# Patient Record
Sex: MALE | Race: WHITE | NOT HISPANIC OR LATINO | Employment: OTHER | URBAN - METROPOLITAN AREA
[De-identification: names, ages, dates, MRNs, and addresses within clinical notes are randomized per-mention and may not be internally consistent; named-entity substitution may affect disease eponyms.]

---

## 2019-10-23 NOTE — PROGRESS NOTES
Patient ID: Sindhu Leroy is a 33 y o  male with autism and intractable epilepsy, s/p VNS implantation, who is presenting to Neurology office for evaluation of his seizures  Assessment/Plan:    [facility]  Overall, the description of his events would be consistent with epileptic seizures  He certainly would be at risk of seizures given his intellectual disability and autism  He has been doing much better with his current regimen, and is not having any further generalized tonic clonic seizures  I discussed that I would prefer to continue to work toward the goal of seizure freedom and no side effects with medications  Although complete seizure freedom may be challenging in patients like Yesenia Sal, it would be best to continue to work toward this  -- he is already on good doses of 3 medications  He may have some room to try to increase his Topiramate or Oxcarbazepine, but I would like to get some bloodwork to assess his medication levels before increasing them  Alternatively, we may need to think about trying a different medication  He has only been on 4 medications, so there are a lot of options going forward  -- I did adjust his VNS, as noted below  S/P placement of VNS (vagus nerve stimulation) device  I did adjust his VNS today  I discussed how VNS works  Although he may be eligible for a newer VNS model which has autostimulation features, because he is on rapid cycling of his VNS, autostimulation would not be available with his current settings  I am not certain that autostimulation would provide much additional benefit over his current rapid cycling settings  I did increase his setting slightly to increase his duty cycle  I would like to see how he does on these settings, but if seizures continue, he will likely need medication adjustments      VNS Settings (at end of visit)  Output current 2 5 mA   Signal frequency 20 Hz   Pulse width 250 ìsec   On time 14 sec   Off time 0 3 min       Mag current 2 5 mA   Mag pulse width 250 ìsec   Mag on time 60 sec     He tolerated these changes well without any discomfort  Autism  His behaviors have been under good control, but they have had difficulty managing his weight, mainly due to trouble keeping him active  They will continue to follow with his PCP        He will return to the office in about 4 months  Subjective:    HPI  Current seizure medications:  1  Oxcarbazepine 900 mg twice a day  2  Trokendi 200 mg in the am and 400 mg in the pm  3  Vimpat 400 mg twice a day  4  Zoloft 100 mg daily  Other medications as per Epic    VNS Settings (settings at beginning of visit)  Output current 2 5 mA   Signal frequency 20 Hz   Pulse width 250 ìsec   On time 21 sec   Off time 0 5 min       Mag current 2 5 mA   Mag pulse width 250 ìsec   Mag on time 60 sec     Battery: 50-75 %     Briefly reviewing his history, he had his first seizure when he was about 16years old with "grand mal" seizures where he would lose consciousness and shake all over  These progressed quickly to where he was having 3-4 generalized tonic clonic seizures per week  He was initially following with a neurologist in [place], and was tried on Carbamazepine, Oxcarbazepine, Topiramate, and Lacosamide  He continued to have seizures, so was recommended to get a VNS implanted  The VNS has been quite helpful for him, mainly by improving his generalized tonic clonic seizures  He has continued to have smaller seizures since that time (as described below)  His mother denies any main side effects to his medications  She also does not recognize any side effects with his seizure medications  He most recently was following with Dr Zoraida Barnett most recently and most recently Vimpat was added and increased  His mother states that overall, he has been doing quite well, but continues to have smaller seizures about every 2-3 months  Event/Seizure semiology:  1    Likely focal unaware seizures: he will start with grunting noises, followed by drooling and loss of consciousness  He will often fall and on occasion may have some shaking  These typically last up to a maximum of about 2 5 minutes  These occur about once every 2 months on average  2  Generalized tonic clonic seizure  He will lose consciousness, fall the ground, become stiff all over and shake  Has not occurred in many years  Special Features  Status epilepticus: no  Self Injury Seizures: no  Precipitating Factors: none    Epilepsy Risk Factors:  Born at term via uncomplicated pregnancy, but did have developmental delay and was diagnosed with autism at 4 yo  No h/o febrile seizures, CNS infections, strokes, or CNS neoplasms  There is no family history of seizures or epilepsy  Prior AEDs:  Carbamazepine (ineffective)    Prior Evaluation:  - MRI brain: mother is not sure, but does not recall one in the past   - Routine EE2006: normal  - Routine EE2016: normal  - Video EEG: yes, in [place] in 1941 [address]  No events were captured    Psychiatric History:  Depression: no  Anxiety: no  Psychosis: no  Psychiatric Admissions: yes in  or  due to destructive behavior    His history was also obtained from his mother, who was present at today's visit  I reviewed prior notes including prior EEG reports and notes from Dr Estella Aponte office, as documented in Epic/Protenus, and summarized above  The following portions of the patient's history were reviewed and updated as appropriate: allergies, current medications, past family history, past medical history, past social history, past surgical history and problem list      Objective:    Blood pressure 132/84, pulse 68, height 5' 7" (1 702 m), weight (!) 142 kg (312 lb)  Physical Exam    Neurological Exam  GENERAL EXAMINATION:   In general patient is obese, but well appearing and in no distress  There is no peripheral edema  NEUROLOGIC EXAMINATION:     Awake and interactive  He would repeat phrases, but not answer questions  Language: he would say some simple words and has echolalia, so would repeat some simple phrases  He would follow simple commands, but was not able to cooperate fully with examination due to his autism  Cranial nerves: Pupils are equal round reactive to light and accommodation  Visual Fields are full to blink to threat  He had normal saccadic eye movements in all directions, but was not able to cooperate with tracking objects  No facial droop, face activates symmetrically  Hearing was intact to conversation, but could not assess finger rub  Tongue and uvula are midline and palate elevates symmetrically  Shoulder shrug  5/5  Motor Exam:  No pronator drift when briefly holding arms outstretched  Bulk and tone are normal  He moves all extremities symmetrically with appearing full strength, but was not able to cooperate with formal strength testing  Deep tendon reflexes: Biceps 2+, brachioradialis 2+, patellar 2+, Achilles 2+ bilaterally  Sensation: responded to light touch in all extremities  Coordination: he was not able to perform finger nose finger and heel to shin testing, but was able to accurately give "high fives" without any ataxia  Gait: not able to assess Romberg  Normal gait  ROS:    Review of Systems   Constitutional: Positive for unexpected weight change  Negative for appetite change and fever  HENT: Negative  Negative for hearing loss, tinnitus, trouble swallowing and voice change  Eyes: Negative  Negative for photophobia and pain  Respiratory: Negative  Negative for shortness of breath  Cardiovascular: Negative  Negative for palpitations  Gastrointestinal: Negative  Negative for nausea and vomiting  Endocrine: Negative  Negative for cold intolerance and heat intolerance  Genitourinary: Negative  Negative for dysuria, frequency and urgency  Musculoskeletal: Negative    Negative for myalgias and neck pain    Skin: Negative  Negative for rash  Neurological: Negative  Negative for dizziness, tremors, seizures, syncope, facial asymmetry, speech difficulty, weakness, light-headedness, numbness and headaches  Hematological: Negative  Does not bruise/bleed easily  Psychiatric/Behavioral: Negative  Negative for confusion, hallucinations and sleep disturbance           I personally reviewed the ROS that was entered by the medical assistant

## 2019-10-24 ENCOUNTER — OFFICE VISIT (OUTPATIENT)
Dept: NEUROLOGY | Facility: CLINIC | Age: 29
End: 2019-10-24
Payer: MEDICARE

## 2019-10-24 VITALS
SYSTOLIC BLOOD PRESSURE: 132 MMHG | DIASTOLIC BLOOD PRESSURE: 84 MMHG | HEART RATE: 68 BPM | HEIGHT: 67 IN | WEIGHT: 312 LBS | BODY MASS INDEX: 48.97 KG/M2

## 2019-10-24 DIAGNOSIS — Z96.89 S/P PLACEMENT OF VNS (VAGUS NERVE STIMULATION) DEVICE: ICD-10-CM

## 2019-10-24 DIAGNOSIS — G40.919 INTRACTABLE EPILEPSY WITHOUT STATUS EPILEPTICUS, UNSPECIFIED EPILEPSY TYPE (HCC): Primary | ICD-10-CM

## 2019-10-24 DIAGNOSIS — F84.0 AUTISM: ICD-10-CM

## 2019-10-24 PROBLEM — G40.909 EPILEPSY (HCC): Status: ACTIVE | Noted: 2019-10-24

## 2019-10-24 PROCEDURE — 99204 OFFICE O/P NEW MOD 45 MIN: CPT | Performed by: PSYCHIATRY & NEUROLOGY

## 2019-10-24 PROCEDURE — 95976 ALYS SMPL CN NPGT PRGRMG: CPT | Performed by: PSYCHIATRY & NEUROLOGY

## 2019-10-24 RX ORDER — OXCARBAZEPINE 600 MG/1
900 TABLET, FILM COATED ORAL 2 TIMES DAILY
Refills: 0 | COMMUNITY
Start: 2019-10-13 | End: 2019-10-24 | Stop reason: SDUPTHER

## 2019-10-24 RX ORDER — OXCARBAZEPINE 600 MG/1
900 TABLET, FILM COATED ORAL 2 TIMES DAILY
Qty: 270 TABLET | Refills: 3 | Status: SHIPPED | OUTPATIENT
Start: 2019-10-24 | End: 2020-07-16 | Stop reason: SDUPTHER

## 2019-10-24 RX ORDER — SERTRALINE HYDROCHLORIDE 100 MG/1
TABLET, FILM COATED ORAL
Refills: 0 | COMMUNITY
Start: 2019-10-21 | End: 2020-03-26 | Stop reason: SDUPTHER

## 2019-10-24 RX ORDER — LACOSAMIDE 200 MG/1
TABLET, FILM COATED ORAL
Refills: 0 | COMMUNITY
Start: 2019-10-07 | End: 2019-10-24 | Stop reason: SDUPTHER

## 2019-10-24 RX ORDER — LACOSAMIDE 200 MG/1
TABLET, FILM COATED ORAL
Qty: 120 TABLET | Refills: 5 | Status: SHIPPED | OUTPATIENT
Start: 2019-10-24 | End: 2020-03-26 | Stop reason: SDUPTHER

## 2019-10-24 NOTE — PATIENT INSTRUCTIONS
-- Continue to give his seizure medications unchanged  -- I did increase his VNS settings today  -- Please get some bloodwork before his next appointment

## 2019-10-24 NOTE — ASSESSMENT & PLAN NOTE
Overall, the description of his events would be consistent with epileptic seizures  He certainly would be at risk of seizures given his intellectual disability and autism  He has been doing much better with his current regimen, and is not having any further generalized tonic clonic seizures  I discussed that I would prefer to continue to work toward the goal of seizure freedom and no side effects with medications  Although complete seizure freedom may be challenging in patients like Anila Harris, it would be best to continue to work toward this  -- he is already on good doses of 3 medications  He may have some room to try to increase his Topiramate or Oxcarbazepine, but I would like to get some bloodwork to assess his medication levels before increasing them  Alternatively, we may need to think about trying a different medication  He has only been on 4 medications, so there are a lot of options going forward  -- I did adjust his VNS, as noted below

## 2019-10-24 NOTE — ASSESSMENT & PLAN NOTE
His behaviors have been under good control, but they have had difficulty managing his weight, mainly due to trouble keeping him active   They will continue to follow with his PCP

## 2019-10-24 NOTE — ASSESSMENT & PLAN NOTE
I did adjust his VNS today  I discussed how VNS works  Although he may be eligible for a newer VNS model which has autostimulation features, because he is on rapid cycling of his VNS, autostimulation would not be available with his current settings  I am not certain that autostimulation would provide much additional benefit over his current rapid cycling settings  I did increase his setting slightly to increase his duty cycle  I would like to see how he does on these settings, but if seizures continue, he will likely need medication adjustments  VNS Settings (at end of visit)  Output current 2 5 mA   Signal frequency 20 Hz   Pulse width 250 ìsec   On time 14 sec   Off time 0 3 min       Mag current 2 5 mA   Mag pulse width 250 ìsec   Mag on time 60 sec     He tolerated these changes well without any discomfort

## 2020-01-24 ENCOUNTER — TELEPHONE (OUTPATIENT)
Dept: NEUROLOGY | Facility: CLINIC | Age: 30
End: 2020-01-24

## 2020-01-24 DIAGNOSIS — G40.919 INTRACTABLE EPILEPSY WITHOUT STATUS EPILEPTICUS, UNSPECIFIED EPILEPSY TYPE (HCC): Primary | ICD-10-CM

## 2020-01-24 NOTE — TELEPHONE ENCOUNTER
Ordinarily since he is not having frequent prolonged seizures, we wouldn't necessarily need to have a rescue medication available  Since it has been such a long time since he had a generalized tonic clonic seizure, if he would have a prolonged seizure lasting greater than 5 min or a cluster of multiple seizures (which are the situations that would require a rescue medication), it may be best for him to be evaluated in the ED either way  If they feel more comfortable having something available just in case, I am okay ordering  Please just find out what dose they have available currently

## 2020-01-24 NOTE — TELEPHONE ENCOUNTER
Patient's mother Jack Sewell called the RX line requesting diazepam she said she uses it just  for emergency's and I did not see it on his med list so I am sending this to you so you can decided if you want to give him a script for it , [facility]

## 2020-01-28 NOTE — TELEPHONE ENCOUNTER
Called mother, read Dr Jus Pinto message  She verbalizes understanding  States she has never had to use it but would like to have it available while patient is at school  States she still had a bottle at school to be safe but it is   Bottle states Diazepam 20 mg MI for episode lasting more than 3 minutes  States she would like it for more than 5 min  Best call back number [phone]  States it is okay to leave detailed message

## 2020-01-29 RX ORDER — DIAZEPAM 20 MG/4ML
20 GEL RECTAL AS NEEDED
Qty: 2 EACH | Refills: 0 | Status: SHIPPED | OUTPATIENT
Start: 2020-01-29 | End: 2021-07-01

## 2020-03-26 ENCOUNTER — TELEMEDICINE (OUTPATIENT)
Dept: NEUROLOGY | Facility: CLINIC | Age: 30
End: 2020-03-26
Payer: MEDICARE

## 2020-03-26 DIAGNOSIS — G40.919 INTRACTABLE EPILEPSY WITHOUT STATUS EPILEPTICUS, UNSPECIFIED EPILEPSY TYPE (HCC): ICD-10-CM

## 2020-03-26 DIAGNOSIS — Z96.89 S/P PLACEMENT OF VNS (VAGUS NERVE STIMULATION) DEVICE: Primary | ICD-10-CM

## 2020-03-26 PROCEDURE — 99214 OFFICE O/P EST MOD 30 MIN: CPT | Performed by: PSYCHIATRY & NEUROLOGY

## 2020-03-26 RX ORDER — SERTRALINE HYDROCHLORIDE 100 MG/1
100 TABLET, FILM COATED ORAL DAILY
Qty: 30 TABLET | Refills: 11 | Status: SHIPPED | OUTPATIENT
Start: 2020-03-26 | End: 2020-12-17 | Stop reason: SDUPTHER

## 2020-03-26 RX ORDER — TOPIRAMATE 100 MG/1
CAPSULE, EXTENDED RELEASE ORAL
Qty: 30 CAPSULE | Refills: 11 | Status: SHIPPED | OUTPATIENT
Start: 2020-03-26 | End: 2020-12-17 | Stop reason: SDUPTHER

## 2020-03-26 RX ORDER — LACOSAMIDE 200 MG/1
TABLET, FILM COATED ORAL
Qty: 120 TABLET | Refills: 5 | Status: SHIPPED | OUTPATIENT
Start: 2020-03-26 | End: 2020-07-16 | Stop reason: SDUPTHER

## 2020-03-26 RX ORDER — TOPIRAMATE 200 MG/1
CAPSULE, EXTENDED RELEASE ORAL
Qty: 90 CAPSULE | Refills: 11 | Status: SHIPPED | OUTPATIENT
Start: 2020-03-26 | End: 2020-12-17 | Stop reason: SDUPTHER

## 2020-07-14 NOTE — PROGRESS NOTES
Patient ID: Gill Wade is a 30 y o  male with autism and intractable epilepsy, s/p VNS implantation, who is returning to Neurology office for follow up of his seizures  Assessment/Plan:    Epilepsy ([facility] )  Overall he has been doing better and has not had any recurrent seizures since his last appointment with his current treatment regimen  Because he is doing well and denies any side effects, I would not recommend any changes to his medications today  --he will continue oxcarbazepine, Vimpat, and Trokendi unchanged  If he would have any additional seizures, we likely would need to consider trying a different medication    S/P placement of VNS (vagus nerve stimulation) device  He continues to tolerate his VNS well without any problems  His better he is getting low, but is still adequate  For follow his VNS battery life, as he may need to initiate the process to have the device replaced when the battery drops below 25%        He will Return in about 4 months (around 11/16/2020)  Subjective:    HPI  Current seizure medications:  1  Oxcarbazepine 900 mg twice a day  2  Vimpat 400 mg twice a day  3  Trokendi 300 mg in the am and 400 mg in the pm    Other medications as per Epic  VNS Settings (interrogated today)  Output current 2 25 mA   Signal frequency 20 Hz   Pulse width 250 ìsec   On time 14 sec   Off time 0 3 min       Mag current 2 5 mA   Mag pulse width 250 ìsec   Mag on time 60 sec     Battery: 25-50%      Since his last visit, he did increase Trokendi to the above dose, and his mother has not noted any seizures and she denies any side effects to his medications  He overall has been doing well  He has not been able to go to his day program due to [place], but he has been doing well otherwise  He continues to tolerate his VNS well, and denies any problems or issues related to this including denying hoarse voice, difficulty swallowing    He did gain a little weight gain       Prior Seizure Medications: Carbamazepine (ineffective )    His history was also obtained from his mother, who was present at today's visit  The following portions of the patient's history were reviewed and updated as appropriate: allergies, current medications, past medical history and problem list      Objective:    Blood pressure 140/82, pulse 82, temperature 97 6 °F (36 4 °C), height 5' 7" (1 702 m), weight (!) 142 kg (313 lb)  Physical Exam    Neurological Exam      ROS:    Review of Systems   Constitutional: Negative  Negative for appetite change and fever  HENT: Negative  Negative for hearing loss, tinnitus, trouble swallowing and voice change  Eyes: Negative  Negative for photophobia and pain  Respiratory: Negative  Negative for shortness of breath  Cardiovascular: Negative  Negative for palpitations  Gastrointestinal: Negative  Negative for nausea and vomiting  Endocrine: Negative  Negative for cold intolerance  Genitourinary: Negative  Negative for dysuria, frequency and urgency  Musculoskeletal: Negative  Negative for myalgias and neck pain  Skin: Negative  Negative for rash  Allergic/Immunologic: Negative  Neurological: Negative  Negative for dizziness, tremors, seizures, syncope, facial asymmetry, speech difficulty, weakness, light-headedness, numbness and headaches  Hematological: Negative  Does not bruise/bleed easily  Psychiatric/Behavioral: Negative  Negative for confusion, hallucinations and sleep disturbance         I personally reviewed the review of systems that was entered by the medical assistant

## 2020-07-16 ENCOUNTER — TELEPHONE (OUTPATIENT)
Dept: NEUROLOGY | Facility: CLINIC | Age: 30
End: 2020-07-16

## 2020-07-16 ENCOUNTER — OFFICE VISIT (OUTPATIENT)
Dept: NEUROLOGY | Facility: CLINIC | Age: 30
End: 2020-07-16
Payer: MEDICARE

## 2020-07-16 VITALS
SYSTOLIC BLOOD PRESSURE: 140 MMHG | DIASTOLIC BLOOD PRESSURE: 82 MMHG | TEMPERATURE: 97.6 F | HEART RATE: 82 BPM | BODY MASS INDEX: 49.12 KG/M2 | WEIGHT: 313 LBS | HEIGHT: 67 IN

## 2020-07-16 DIAGNOSIS — G40.919 INTRACTABLE EPILEPSY WITHOUT STATUS EPILEPTICUS, UNSPECIFIED EPILEPSY TYPE (HCC): ICD-10-CM

## 2020-07-16 DIAGNOSIS — Z96.89 S/P PLACEMENT OF VNS (VAGUS NERVE STIMULATION) DEVICE: Primary | ICD-10-CM

## 2020-07-16 PROCEDURE — 95970 ALYS NPGT W/O PRGRMG: CPT | Performed by: PSYCHIATRY & NEUROLOGY

## 2020-07-16 PROCEDURE — 99214 OFFICE O/P EST MOD 30 MIN: CPT | Performed by: PSYCHIATRY & NEUROLOGY

## 2020-07-16 RX ORDER — OXCARBAZEPINE 600 MG/1
900 TABLET, FILM COATED ORAL 2 TIMES DAILY
Qty: 270 TABLET | Refills: 3 | Status: SHIPPED | OUTPATIENT
Start: 2020-07-16 | End: 2020-12-17 | Stop reason: SDUPTHER

## 2020-07-16 RX ORDER — LACOSAMIDE 200 MG/1
TABLET, FILM COATED ORAL
Qty: 120 TABLET | Refills: 5 | Status: SHIPPED | OUTPATIENT
Start: 2020-07-16 | End: 2020-12-17 | Stop reason: SDUPTHER

## 2020-07-16 NOTE — LETTER
July 16, 2020     [facility] [id number] [facility]  [address] [address]     Dear Mr Haydee Arreaga,    Appointment Missed: 7/16/2020      Appointment Scheduled with: Sagar Stevens MD    We understand that many situations arise that occasionally prevents patients from keeping scheduled appointments  It is the policy of [address] [place] [address] Neurology [facility] that patients notify us 24 hours in advance if unable to keep a scheduled appointment  Missed appointments jeopardize strong physician-patient relationships  The appointment you missed could have easily been made available to another patient if you had contacted us to cancel  We like to accommodate all of our patients, but when patients miss an appointment it prevents us from being able to help everyone  In the future, we request at least a 24 hour notice of cancellation so we can make your appointment available to someone else in need         Sincerely,    The Physicians and Staff of [address] [place] [address] Neurology [facility]

## 2020-07-16 NOTE — TELEPHONE ENCOUNTER
Left message to schedule appointment as patient NO/Show for his appt today 7/16/2020 with Jaime Piedra

## 2020-07-16 NOTE — PATIENT INSTRUCTIONS
-- Continue to give his seizure medications unchanged  -- His VNS battery is a little lower than before, but still okay  We will need to assure we continue to follow the VNS battery level

## 2020-09-21 NOTE — ASSESSMENT & PLAN NOTE
He continues to tolerate his VNS well without any problems  His better he is getting low, but is still adequate    For follow his VNS battery life, as he may need to initiate the process to have the device replaced when the battery drops below 25%

## 2020-09-21 NOTE — ASSESSMENT & PLAN NOTE
Overall he has been doing better and has not had any recurrent seizures since his last appointment with his current treatment regimen  Because he is doing well and denies any side effects, I would not recommend any changes to his medications today  --he will continue oxcarbazepine, Vimpat, and Trokendi unchanged    If he would have any additional seizures, we likely would need to consider trying a different medication

## 2020-12-16 NOTE — PROGRESS NOTES
Virtual Brief Visit    Assessment/Plan:    Patient ID: You Gould is a 35 y o  male with autism and intractable epilepsy, s/p VNS implantation, who is returning for follow-up of seizures  Assessment/Plan:    Epilepsy [facility])  He did have a breakthrough seizure since his last appointment  He has been tolerating his medications well without any side effects  He did previously have a good response to oxcarbazepine, so I will increase his dose of oxcarbazepine a little bit today  --he will increase oxcarbazepine to be 900 mg in the morning and 1200 mg at night  I did discuss the risks rationale this increase including potential side effects  Because we are increasing this dose, will be important for them to watch for any evidence of toxicity, and if this would occur, we may need to decrease his dose back down to try an alternative medication  S/P placement of VNS (vagus nerve stimulation) device    I discussed that because his VNS is on rapid cycling, and will be important for us to interrogate this at his next office appointment  I would prefer that he be seen in person, so that we can interrogate this and assure that he has adequate battery life  He will No follow-ups on file  Reason for visit is   Chief Complaint   Patient presents with    Seizures        Encounter provider Yoselyn Betancourt MD    Provider located at [address] [address]  [address] [place] [address] [place] [id number]    Recent Visits  No visits were found meeting these conditions  Showing recent visits within past 7 days and meeting all other requirements     Future Appointments  No visits were found meeting these conditions     Showing future appointments within next 150 days and meeting all other requirements      It was my intent to perform this visit via video technology but the patient was not able to do a video connection so the visit was completed via audio telephone only  After connecting through telephone, the patient was identified by name and date of birth  Yumiko Escalona's caregiver (his mother) was informed that this is a telemedicine visit and that the visit is being conducted through telephone  My office door was closed  No one else was in the room  The caregiver acknowledged consent and understanding of privacy and security of the platform  The patient's caregiver has agreed to participate and understands they can discontinue the visit at any time  Patient's caregiver is aware this is a billable service  Yumiko Faustin was not able to consent to the above due to intellectual disability      Patient is aware this is a billable service  Subjective    Hamzah Faustin is a 35 y o  male     Since his last visit, he has been doing pretty good  His school has been closed on and off  He did have one seizure where he was on the computer  He suddenly fell to the floor, off of the chair  He did briefly stiffen and shake  This lasted maybe 2 min  There were no clear triggers or missed doses of his medications  He has been gaining weight, but that largely has been due to lack of physical activity  Otherwise, he has been doing well  They deny any side effects to his medications  His mother reports that he did get some bloodwork in about September of this last year  This was done at [facility] outpatient services  Current seizure medications:  1  Oxcarbazepine 900 mg twice a day  2  Vimpat 400 mg twice a day  3  Trokendi 300 mg in the morning and 400 mg at night  Other medications as per Epic      VNS Settings (not able to be interrogated today due to virtual visit)  Output current 2 25 mA   Signal frequency 20 Hz   Pulse width 250 ?sec   On time 14 sec   Off time 0 3 min         Mag current 2 5 mA   Mag pulse width 250 ?sec   Mag on time 60 sec     Prior Medications:  Carbamazepine (ineffective)    The following portions of the patient's history were reviewed and updated as appropriate: allergies, current medications, past medical history and problem list    HPI     Past Medical History:   Diagnosis Date    Autism     Seizures ([facility] [address] )        Past Surgical History:   Procedure Laterality Date    VAGAL NERVE STIMULATOR (VNS) PLACEMENT         Current Outpatient Medications   Medication Sig Dispense Refill    OXcarbazepine (TRILEPTAL) 600 mg tablet Give 900 mg (1 5 tabs) in the morning and 1200 mg (2 tabs) at night  315 tablet 3    sertraline (ZOLOFT) 100 mg tablet Take 1 tablet (100 mg total) by mouth daily 30 tablet 11    Trokendi  MG CP24 Take 1 pill in the morning with one 200 mg pill for a total morning dose of 300 mg in the morning  30 capsule 11    Trokendi  MG CP24 Take 200 (1 tab) in the morning and 400 mg (2 tabs) at night 90 capsule 11    Vimpat 200 MG tablet Take 400 mg (2 tabs) twice a day 120 tablet 5    diazePAM 20 MG GEL Insert 20 mg into the rectum as needed (for seizure greater than 5 minutes or cluster of seizures) (Patient not taking: Reported on 12/16/2020) 2 each 0     No current facility-administered medications for this visit  Allergies   Allergen Reactions    Amoxicillin Hives       Review of Systems   Constitutional: Negative  Negative for appetite change and fever  HENT: Negative  Negative for hearing loss, tinnitus, trouble swallowing and voice change  Eyes: Negative  Negative for photophobia and pain  Respiratory: Negative  Negative for shortness of breath  Cardiovascular: Negative  Negative for palpitations  Gastrointestinal: Negative  Negative for nausea and vomiting  Endocrine: Negative  Negative for cold intolerance  Genitourinary: Negative  Negative for dysuria, frequency and urgency  Musculoskeletal: Negative  Negative for back pain, gait problem, myalgias and neck pain  Skin: Negative  Negative for rash  Neurological: Negative    Negative for dizziness, tremors, seizures, syncope, facial asymmetry, speech difficulty, weakness, light-headedness, numbness and headaches  Hematological: Negative  Does not bruise/bleed easily  Psychiatric/Behavioral: Negative  Negative for confusion, decreased concentration, hallucinations and sleep disturbance  I personally reviewed the review of systems that was entered by the medical assistant     There were no vitals filed for this visit  I spent 18 minutes with patient today in which greater than 50% of the time was spent in counseling/coordination of care regarding Medications and plan, as detailed above    VIRTUAL VISIT [address] caregiver acknowledges that he/she has consented to an online visit or consultation and understands that the online visit is based solely on information provided by them, and that, in the absence of a face-to-face physical evaluation by the physician, the diagnosis received is both limited and provisional in terms of accuracy and completeness  This is not intended to replace a full medical face-to-face evaluation by the physician  Ronadl Gaona Escalona's caregiver understands and accepts these terms

## 2020-12-17 ENCOUNTER — TELEMEDICINE (OUTPATIENT)
Dept: NEUROLOGY | Facility: CLINIC | Age: 30
End: 2020-12-17
Payer: MEDICARE

## 2020-12-17 DIAGNOSIS — G40.919 INTRACTABLE EPILEPSY WITHOUT STATUS EPILEPTICUS, UNSPECIFIED EPILEPSY TYPE (HCC): ICD-10-CM

## 2020-12-17 DIAGNOSIS — Z96.89 S/P PLACEMENT OF VNS (VAGUS NERVE STIMULATION) DEVICE: Primary | ICD-10-CM

## 2020-12-17 PROCEDURE — 99442 PR PHYS/QHP TELEPHONE EVALUATION 11-20 MIN: CPT | Performed by: PSYCHIATRY & NEUROLOGY

## 2020-12-17 RX ORDER — SERTRALINE HYDROCHLORIDE 100 MG/1
100 TABLET, FILM COATED ORAL DAILY
Qty: 30 TABLET | Refills: 11 | Status: SHIPPED | OUTPATIENT
Start: 2020-12-17 | End: 2022-01-14

## 2020-12-17 RX ORDER — LACOSAMIDE 200 MG/1
TABLET, FILM COATED ORAL
Qty: 120 TABLET | Refills: 5 | Status: SHIPPED | OUTPATIENT
Start: 2020-12-17 | End: 2021-07-01 | Stop reason: SDUPTHER

## 2020-12-17 RX ORDER — TOPIRAMATE 100 MG/1
CAPSULE, EXTENDED RELEASE ORAL
Qty: 30 CAPSULE | Refills: 11 | Status: SHIPPED | OUTPATIENT
Start: 2020-12-17 | End: 2021-10-21 | Stop reason: SDUPTHER

## 2020-12-17 RX ORDER — OXCARBAZEPINE 600 MG/1
TABLET, FILM COATED ORAL
Qty: 315 TABLET | Refills: 3 | Status: SHIPPED | OUTPATIENT
Start: 2020-12-17 | End: 2021-10-21 | Stop reason: SDUPTHER

## 2020-12-17 RX ORDER — TOPIRAMATE 200 MG/1
CAPSULE, EXTENDED RELEASE ORAL
Qty: 90 CAPSULE | Refills: 11 | Status: SHIPPED | OUTPATIENT
Start: 2020-12-17 | End: 2021-10-21 | Stop reason: SDUPTHER

## 2020-12-17 NOTE — PATIENT INSTRUCTIONS
-- Please increase his Oxcarbazepine to be 900 mg in the morning and 1200 mg at night  -- Continue his other seizure medications unchanged  -- Please look out for side effects including dizziness, clumsiness, imbalance, nausea, or other problems  If he would develop new side effects with the increase in medication, please give our office a call, since we will need to lower the dose back down and try different medication    -- we will try to track down the bloodwork that was done in September  If we can't see that this was completed, we may order repeat bloodwork

## 2021-01-12 NOTE — ASSESSMENT & PLAN NOTE
He did have a breakthrough seizure since his last appointment  He has been tolerating his medications well without any side effects  He did previously have a good response to oxcarbazepine, so I will increase his dose of oxcarbazepine a little bit today  --he will increase oxcarbazepine to be 900 mg in the morning and 1200 mg at night  I did discuss the risks rationale this increase including potential side effects  Because we are increasing this dose, will be important for them to watch for any evidence of toxicity, and if this would occur, we may need to decrease his dose back down to try an alternative medication

## 2021-01-12 NOTE — ASSESSMENT & PLAN NOTE
I discussed that because his VNS is on rapid cycling, and will be important for us to interrogate this at his next office appointment  I would prefer that he be seen in person, so that we can interrogate this and assure that he has adequate battery life

## 2021-06-21 ENCOUNTER — TELEPHONE (OUTPATIENT)
Dept: NEUROLOGY | Facility: CLINIC | Age: 31
End: 2021-06-21

## 2021-07-01 ENCOUNTER — TELEPHONE (OUTPATIENT)
Dept: NEUROLOGY | Facility: CLINIC | Age: 31
End: 2021-07-01

## 2021-07-01 ENCOUNTER — OFFICE VISIT (OUTPATIENT)
Dept: NEUROLOGY | Facility: CLINIC | Age: 31
End: 2021-07-01
Payer: MEDICARE

## 2021-07-01 VITALS
SYSTOLIC BLOOD PRESSURE: 144 MMHG | WEIGHT: 315 LBS | BODY MASS INDEX: 49.44 KG/M2 | HEIGHT: 67 IN | DIASTOLIC BLOOD PRESSURE: 90 MMHG

## 2021-07-01 DIAGNOSIS — Z96.89 S/P PLACEMENT OF VNS (VAGUS NERVE STIMULATION) DEVICE: Primary | ICD-10-CM

## 2021-07-01 DIAGNOSIS — G40.919 INTRACTABLE EPILEPSY WITHOUT STATUS EPILEPTICUS, UNSPECIFIED EPILEPSY TYPE (HCC): ICD-10-CM

## 2021-07-01 PROCEDURE — 99214 OFFICE O/P EST MOD 30 MIN: CPT | Performed by: PSYCHIATRY & NEUROLOGY

## 2021-07-01 RX ORDER — LACOSAMIDE 200 MG/1
TABLET, FILM COATED ORAL
Qty: 120 TABLET | Refills: 5 | Status: SHIPPED | OUTPATIENT
Start: 2021-07-01 | End: 2021-10-21 | Stop reason: SDUPTHER

## 2021-07-01 RX ORDER — DIAZEPAM 20 MG/4ML
20 GEL RECTAL AS NEEDED
Qty: 2 EACH | Refills: 0 | Status: CANCELLED | OUTPATIENT
Start: 2021-07-01

## 2021-07-01 RX ORDER — DIAZEPAM 10 MG/100UL
SPRAY NASAL
Qty: 4 EACH | Refills: 0 | Status: SHIPPED | OUTPATIENT
Start: 2021-07-01

## 2021-07-01 NOTE — PATIENT INSTRUCTIONS
-- I will not make any changes to your medications today  -- I will refer you to see Neurosurgery to have the VNS generator replaced  -- I did also send a new prescription for Valtoco (intranasal diazepam) as a rescue medication

## 2021-07-01 NOTE — TELEPHONE ENCOUNTER
Pt had an office visit with Dr Dudley Jsoe on 7/1/2021  Dr Dudley Jose would like the pt to have a 3 month follow up appt  Dr Dudley Jose does not have availability in [place] and the pt does not want to be seen in another office  Explained to pt that someone will call the pt to schedule the follow up appt

## 2021-07-01 NOTE — PROGRESS NOTES
Patient ID: Janiya Najera is a 35 y o  male with autism and intractable epilepsy, s/p VNS implantation, who is returning to Neurology office for follow up of his seizures  Assessment/Plan:    Epilepsy [facility])    He has not had any additional seizures since his last appointment is tolerating his medications well  He also has been doing well with his vagal nerve stimulator, and they deny any other problems today  Because he is doing well overall, I would not recommend any changes to his medications today  -- he will continue oxcarbazepine, Vimpat, and Trokendi unchanged  S/P placement of VNS (vagus nerve stimulation) device    I interrogated his VNS, his battery is getting quite low, now being in the 11-25% range  He has benefited from the addition of the vagal nerve stimulator, so be best for us to replace the device  -- I will refer him to Neurosurgery for replacement of his VNS generator  When he does have this replaced, I would recommend upgrading to the [facility] device, since this would allow us to potentially use the auto stimulation feature if desired  He has been seizure-free, so there may be little need to change his settings, but utilizing the auto stimulation feature may allow us to preserve his battery life longer if possible  He will Return in about 3 months (around 10/1/2021)  Subjective:    HPI  Current seizure medications:  1  Oxcarbazepine 900 mg in the morning and 1200 mg at night  2  Vimpat 400 mg twice a day  3  Trokendi 300 mg in the morning and 400 mg at night  Other medications as per Epic  VNS Settings (interrogated today)  Output current 2 25 mA   Signal frequency 20 Hz   Pulse width 250 ?sec   On time 14 sec   Off time 0 3 min       Mag current 2 5 mA   Mag pulse width 250 ?sec   Mag on time 60 sec     Battery: 11-25%     Since his last visit, he has not had any seizures for several months since his Oxcarbazepine was increased     He continues to tolerate his medications well without any significant side effects  His mother does note that he has had weight gain and has difficulty with overeating and impulse control  He has been following with his PCP and has been noted to have elevated blood pressure  There considering starting antihypertensives  Prior Seizure Medications: Carbamazepine (ineffective)    His history was also obtained from his mother, who was present at today's visit  Objective:    Blood pressure 144/90, height 5' 7" (1 702 m), weight (!) 147 kg (325 lb)  Physical Exam    Neurological Exam      ROS:    Review of Systems   Constitutional: Positive for appetite change  Negative for fever  HENT: Negative  Negative for hearing loss, tinnitus, trouble swallowing and voice change  Eyes: Negative  Negative for photophobia and pain  Respiratory: Positive for shortness of breath  Cardiovascular: Negative  Negative for palpitations  Gastrointestinal: Negative  Negative for nausea and vomiting  Endocrine: Negative  Negative for cold intolerance  Genitourinary: Negative  Negative for dysuria, frequency and urgency  Musculoskeletal: Negative  Negative for myalgias and neck pain  Skin: Negative  Negative for rash  Neurological: Negative  Negative for dizziness, tremors, seizures, syncope, facial asymmetry, speech difficulty, weakness, light-headedness, numbness and headaches  Hematological: Negative  Does not bruise/bleed easily  Psychiatric/Behavioral: Negative  Negative for confusion, hallucinations and sleep disturbance  All other systems reviewed and are negative        I personally reviewed the ROS that was entered by the medical assistant

## 2021-07-05 NOTE — ASSESSMENT & PLAN NOTE
I interrogated his VNS, his battery is getting quite low, now being in the 11-25% range  He has benefited from the addition of the vagal nerve stimulator, so be best for us to replace the device  -- I will refer him to Neurosurgery for replacement of his VNS generator  When he does have this replaced, I would recommend upgrading to the [facility] device, since this would allow us to potentially use the auto stimulation feature if desired  He has been seizure-free, so there may be little need to change his settings, but utilizing the auto stimulation feature may allow us to preserve his battery life longer if possible

## 2021-07-20 ENCOUNTER — TELEPHONE (OUTPATIENT)
Dept: NEUROLOGY | Facility: CLINIC | Age: 31
End: 2021-07-20

## 2021-07-20 NOTE — TELEPHONE ENCOUNTER
Call received from patients [facility]  nick requires [place]  Call placed to pharmacy  ID: [id number]  GROUP: [id number]  Hiram Jair: [id number]    Ned CORRAL Case ID: [id number]   Awaiting determination

## 2021-07-20 NOTE — TELEPHONE ENCOUNTER
Valtoco approved through 7/20/2022  Notified mom of approval      Notified pharmacy of approval  Medication went through and in stock will process for patient

## 2021-07-21 ENCOUNTER — OFFICE VISIT (OUTPATIENT)
Dept: NEUROSURGERY | Facility: CLINIC | Age: 31
End: 2021-07-21
Payer: MEDICARE

## 2021-07-21 ENCOUNTER — TELEPHONE (OUTPATIENT)
Dept: NEUROLOGY | Facility: CLINIC | Age: 31
End: 2021-07-21

## 2021-07-21 VITALS
RESPIRATION RATE: 16 BRPM | WEIGHT: 315 LBS | SYSTOLIC BLOOD PRESSURE: 138 MMHG | BODY MASS INDEX: 49.44 KG/M2 | DIASTOLIC BLOOD PRESSURE: 86 MMHG | HEIGHT: 67 IN | HEART RATE: 70 BPM | TEMPERATURE: 98.7 F

## 2021-07-21 DIAGNOSIS — G40.919 INTRACTABLE EPILEPSY WITHOUT STATUS EPILEPTICUS, UNSPECIFIED EPILEPSY TYPE (HCC): ICD-10-CM

## 2021-07-21 DIAGNOSIS — Z45.42 BATTERY END OF LIFE OF VAGUS NERVE STIMULATOR: Primary | ICD-10-CM

## 2021-07-21 DIAGNOSIS — E66.01 OBESITY, MORBID (HCC): ICD-10-CM

## 2021-07-21 DIAGNOSIS — Z96.89 S/P PLACEMENT OF VNS (VAGUS NERVE STIMULATION) DEVICE: ICD-10-CM

## 2021-07-21 PROCEDURE — 99205 OFFICE O/P NEW HI 60 MIN: CPT | Performed by: NEUROLOGICAL SURGERY

## 2021-07-21 RX ORDER — CLINDAMYCIN PHOSPHATE 900 MG/50ML
900 INJECTION INTRAVENOUS ONCE
Status: CANCELLED | OUTPATIENT
Start: 2021-07-26

## 2021-07-21 NOTE — PATIENT INSTRUCTIONS
Instructions for care preoperative and postoperative   · Copy of  written preoperative teaching instructions provided     · Medication exclusion list provided and reviewed - do not take 7 days prior surgery or 14 days postoperatively including  OTC, supplements,  ASA containing  products (Excedrin migraine) and NSAID  · Read and review the education booklet provided by the OR  today  · Explained preoperative skin preparation hygiene protocol using Hibiclens (chlorhexidine) body wash and NEO wipes  · Informed an antibiotic will be prescribed the day prior surgery,  start night of surgery and continue thru completion  · Avoid lifting greater than 10 lbs using arm on operative side, no repetitive arm movements pushing , pulling, or rotation  , no stretching thru 6 weeks post op  · Do not drive for 2 week after surgery (deviation from this  restriction is at the discretion of the surgeon)  · Can ambulate as tolerated immediately post op   · Avoid immersion in water no swimming, hot tub use , or tub bath thru  6 week post -op   · Postoperative pain management extra strength Tylenol recommended but can order opiate  500 mg every 4 hours or 100 mg every 8 , maximum tablets daily 6 , no more than 3000 mg  · Postoperative follow-up appointments reviewed,  2 week visit for surgical incision assessment  · Schedule appointment with neurologist for visit within 2-3 week after surgery      · The product Rep is present during surgery

## 2021-07-21 NOTE — TELEPHONE ENCOUNTER
[facility] from [facility]  They were contacted by neurosurgery  Agustina looking to determine which battery will be used for replacement on surgery day  Michelle Guzman is assuming the 1000 (?) would be used unless you have preference to something else  Mom also has additional questions  In order for [employer] rep to reach out to them, they are requiring PIQ/HIPAA form completed  Emailed to you

## 2021-07-21 NOTE — PROGRESS NOTES
Assessment/Plan:    S/P placement of VNS (vagus nerve stimulation) device  As addressed in HPI  Generator nearing EOL need replacement  Obesity, morbid ([address] )  BMI 51 06   Risk factor for surgery predispose to postoperative surgical incision dehiscence and infection  Mother has vacation planned at [facility] about 1 week after surgery, she questioned if patient can go in the ocean  Explained in detail postoperative restrictions and incision care as per protocol, written instructions provided  PLAN  Explained in detail prescribing prophylactic antibiotic and personal hygiene care preoperatively and postoperatively, chlorhexidine hygiene products provided  Battery end of life of vagus nerve stimulator  As addressed in HPI   Dr Scotty Andrade explained in detail, the procedure, the risks and benefits of surgery,  expected postoperative course, and answered all questions , also providing contact information if future questions arise, mother wishes to proceed with surgery for replacement of VNS generator left upper chest     Dr Scotty Andrade obtained verbal and written consent  Mother inquired about generator type for replacement, she discussed this with Dr Jazmin Loaiza, advised will defer  to [facility] product rep, e-mail sent to  Perla Tolliver to address contact mother form product review and Dr Jazmin Loaiza  Left upper chest surgical incision has a well healed scar       Preoperative Assessments:     · Prior General Anesthesia Reactions ----denies  · Exercise Capacity ---   Mother admits son has exertional dyspnea but no chest pain when climbing 2 flights of stairs or when walking 2 city blocks secondary to morbid obesity (BMI 51 06), and sedentary  Lifestyle  · Nicotine dependence ---- Never smoked  · Personal history of venous thromboembolic disease --denies  · Bleeding Tendency --- denies easily bruising, or spontaneous nose or gum bleeds     · Nickel or metal reaction/allergy--denies , surgical staples in past without allergic response  · History of cancer or other health condition that requires routine MRI imagining denies  VNS is MRI compliant? Surgery Clearance  · PCP/Medical Clearance--- not indicated as per Dr Yanet Haynes  · Cardiac- Not required    · PAT---pending completion, mother will complete tomorrow  · Imagining requirement--non required for procedure  · In preparation for surgery preoperative written instructions provided , explained in detail to mother copy provided and documented in AVS        Intractable epilepsy without status epilepticus, unspecified epilepsy type [facility])  -     Ambulatory referral to Neurosurgery    S/P placement of VNS (vagus nerve stimulation) device  -     Ambulatory referral to Neurosurgery        Subjective: Vagal nerve stimulator generator is near EOL    Patient ID: Che Wheeler is a 35 y o  male     HPI   Has a longstanding history of epilepsy since age 13 and is autistic  He has a history of developmental delay ane obesity  Seizures did not improve with AED therapy worsened in frequency and severity  As documented throughout medical record 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  Diagnosed with medically intractable epilepsy and underwent implantation of Vagal nerve stimulator 9/15/2011, a age 24 at [address] generator replacement 2/16/2017 @ [address] was nearing EOL     Recent office visit with neurologist Dr Jared Cantu  Interrogation of Vagus Nerve stimulator revealed battery nearing end of life reading at  11-25 %  range   As per Dr Rosalinda Shook note 7/1/2021 ,he has benefited from vagal nerve stimulator best for generator replacement  Referred to neurosurgery  After assessment and review is determined appropriate to proceed with replacement of vagus nerve generator is nearing end of life    Mother reports improved seizure frequency of occurrence and severity since implant of  VNS, she wishes to proceed with surgeyr for replacement  Dr Guillermina Ramirez agrees to proceed with surgery scheduled for 7/26/2021 REOPENING OF LEFT UPPER CHEST INCISION FOR REPLACEMENT OG IMPLANTABLE GENERATOR VAGUS NERVE STIMULATOR  Dr Guillermina Ramirez and I have spent 60 minutes with patient/mother today in which greater than 50% of this time was spent in counseling/coordination of care regarding proposed surgical procedure, impressions and assessment findinges reviewed in detail, risks and benefits and surgical procedure were explained, all questions were answered in detail, mother  verbalized an understanding and was provided with contact information if additional questions arise  REVIEW OF SYSTEMS  Review of Systems   Skin: Positive for wound  Neurological: Positive for seizures  Psychiatric/Behavioral:        Autism         Meds/Allergies     Current Outpatient Medications   Medication Sig Dispense Refill    OXcarbazepine (TRILEPTAL) 600 mg tablet Give 900 mg (1 5 tabs) in the morning and 1200 mg (2 tabs) at night  315 tablet 3    sertraline (ZOLOFT) 100 mg tablet Take 1 tablet (100 mg total) by mouth daily 30 tablet 11    Trokendi  MG CP24 Take 1 pill in the morning with one 200 mg pill for a total morning dose of 300 mg in the morning  30 capsule 11    Trokendi  MG CP24 Take 200 (1 tab) in the morning and 400 mg (2 tabs) at night 90 capsule 11    Vimpat 200 MG tablet Take 400 mg (2 tabs) twice a day 120 tablet 5    diazePAM, 20 MG Dose, (Valtoco 20 MG Dose) 2 x 10 MG/0 1ML LQPK Give one 10 mg spray device in each nostril for total dose of 20 mg as needed for seizures lasting longer than 5 min  (Patient not taking: Reported on 7/21/2021) 4 each 0     No current facility-administered medications for this visit         Allergies   Allergen Reactions    Amoxicillin Hives       PAST HISTORY    Past Medical History:   Diagnosis Date    Autism     Seizures ([facility] [address] )        Past Surgical History:   Procedure Laterality Date    EXTERNAL EAR SURGERY      x2 for boxer's ear    HYDROCELE EXCISION / REPAIR      VAGAL NERVE STIMULATOR (VNS) PLACEMENT         Social History     Tobacco Use    Smoking status: Never Smoker    Smokeless tobacco: Never Used   Substance Use Topics    Alcohol use: Never    Drug use: Never       Family History   Problem Relation Age of Onset    No Known Problems Mother     Arthritis Father     COPD Maternal Grandmother     Heart disease Maternal Grandfather     Arthritis Paternal Grandmother     Heart disease Paternal Grandfather        The following portions of the patient's history were reviewed and updated as appropriate: allergies, current medications, past family history, past medical history, past social history, past surgical history and problem list       EXAM    Vitals:Blood pressure 138/86, pulse 70, temperature 98 7 °F (37 1 °C), temperature source Tympanic, resp  rate 16, height 5' 7" (1 702 m), weight (!) 148 kg (326 lb)  ,Body mass index is 51 06 kg/m²  Physical Exam  Vitals and nursing note reviewed  Exam conducted with a chaperone present (mother )  Constitutional:       General: He is not in acute distress  Appearance: He is not ill-appearing, toxic-appearing or diaphoretic  Comments: Autistic watched movie on phone during entire visit , non verbal, making unintellectual noises    HENT:      Head: Normocephalic  Eyes:      General: No scleral icterus  Right eye: No discharge  Left eye: No discharge  Extraocular Movements: Extraocular movements intact  Conjunctiva/sclera: Conjunctivae normal    Cardiovascular:      Heart sounds: Normal heart sounds  Pulmonary:      Effort: Pulmonary effort is normal  No respiratory distress  Breath sounds: Normal breath sounds  Abdominal:      General: Bowel sounds are normal    Musculoskeletal:         General: Normal range of motion  Cervical back: Normal range of motion  Right lower leg: No edema  Left lower leg: No edema  Skin:     General: Skin is warm and dry  Coloration: Skin is pale  Neurological:      Mental Status: He is alert  Mental status is at baseline  Gait: Gait is intact  Psychiatric:      Comments: Autistic , intellectual delay  Neurologic Exam     Mental Status   Level of consciousness: alert (autistic)    Motor Exam     Strength   Right quadriceps: 5/5  Left quadriceps: 5/5  Right hamstrin/5  Left hamstrin/5  Right anterior tibial: 5/5  Left anterior tibial: 5/5  Left peroneal: 5/5  Right gastroc: 5/5  Left gastroc: 5/5    Gait, Coordination, and Reflexes     Gait  Gait: normal      Imaging Studies  No results found

## 2021-07-21 NOTE — TELEPHONE ENCOUNTER
Patient's mother calling to say that the patient will be having VNS surgery on 7/26  Wanted to know when you would like to see him  Stated that you usually like to see patients 2 weeks after placement  No open appointments within that time  Please advise  Mom stated that she will be on vacation on 8/7-8/13

## 2021-07-21 NOTE — ASSESSMENT & PLAN NOTE
BMI 51 06   Risk factor for surgery predispose to postoperative surgical incision dehiscence and infection  Mother has vacation planned at [facility] about 1 week after surgery, she questioned if patient can go in the ocean  Explained in detail postoperative restrictions and incision care as per protocol, written instructions provided  PLAN  Explained in detail prescribing prophylactic antibiotic and personal hygiene care preoperatively and postoperatively, chlorhexidine hygiene products provided

## 2021-07-21 NOTE — H&P (VIEW-ONLY)
Assessment/Plan:    S/P placement of VNS (vagus nerve stimulation) device  As addressed in HPI  Generator nearing EOL need replacement  Obesity, morbid ([address] )  BMI 51 06   Risk factor for surgery predispose to postoperative surgical incision dehiscence and infection  Mother has vacation planned at [facility] about 1 week after surgery, she questioned if patient can go in the ocean  Explained in detail postoperative restrictions and incision care as per protocol, written instructions provided  PLAN  Explained in detail prescribing prophylactic antibiotic and personal hygiene care preoperatively and postoperatively, chlorhexidine hygiene products provided  Battery end of life of vagus nerve stimulator  As addressed in HPI   Dr Zonia Lutz explained in detail, the procedure, the risks and benefits of surgery,  expected postoperative course, and answered all questions , also providing contact information if future questions arise, mother wishes to proceed with surgery for replacement of VNS generator left upper chest     Dr Zonia Lutz obtained verbal and written consent  Mother inquired about generator type for replacement, she discussed this with Dr Aditi Washington, advised will defer  to [facility] product rep, e-mail sent to  [facility] to address contact mother form product review and Dr Aditi Washington  Left upper chest surgical incision has a well healed scar       Preoperative Assessments:     · Prior General Anesthesia Reactions ----denies  · Exercise Capacity ---   Mother admits son has exertional dyspnea but no chest pain when climbing 2 flights of stairs or when walking 2 city blocks secondary to morbid obesity (BMI 51 06), and sedentary  Lifestyle  · Nicotine dependence ---- Never smoked  · Personal history of venous thromboembolic disease --denies  · Bleeding Tendency --- denies easily bruising, or spontaneous nose or gum bleeds     · Nickel or metal reaction/allergy--denies , surgical staples in past without allergic response  · History of cancer or other health condition that requires routine MRI imagining denies  VNS is MRI compliant? Surgery Clearance  · PCP/Medical Clearance--- not indicated as per Dr Joshua Nice  · Cardiac- Not required    · PAT---pending completion, mother will complete tomorrow  · Imagining requirement--non required for procedure  · In preparation for surgery preoperative written instructions provided , explained in detail to mother copy provided and documented in AVS        Intractable epilepsy without status epilepticus, unspecified epilepsy type [facility])  -     Ambulatory referral to Neurosurgery    S/P placement of VNS (vagus nerve stimulation) device  -     Ambulatory referral to Neurosurgery        Subjective: Vagal nerve stimulator generator is near EOL    Patient ID: Joe Collado is a 35 y o  male     HPI   Has a longstanding history of epilepsy since age 13 and is autistic  He has a history of developmental delay ane obesity  Seizures did not improve with AED therapy worsened in frequency and severity  As documented throughout medical record 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  Diagnosed with medically intractable epilepsy and underwent implantation of Vagal nerve stimulator 9/15/2011, a age 24 at [address] generator replacement 2/16/2017 @ [address] was nearing EOL     Recent office visit with neurologist Dr Angy Love  Interrogation of Vagus Nerve stimulator revealed battery nearing end of life reading at  11-25 %  range   As per Dr Rodrigo Bhandari note 7/1/2021 ,he has benefited from vagal nerve stimulator best for generator replacement  Referred to neurosurgery  After assessment and review is determined appropriate to proceed with replacement of vagus nerve generator is nearing end of life    Mother reports improved seizure frequency of occurrence and severity since implant of  VNS, she wishes to proceed with surgeyr for replacement  Dr Pmaela Álvarez agrees to proceed with surgery scheduled for 7/26/2021 REOPENING OF LEFT UPPER CHEST INCISION FOR REPLACEMENT OG IMPLANTABLE GENERATOR VAGUS NERVE STIMULATOR  Dr Pamela Álvarez and I have spent 60 minutes with patient/mother today in which greater than 50% of this time was spent in counseling/coordination of care regarding proposed surgical procedure, impressions and assessment findinges reviewed in detail, risks and benefits and surgical procedure were explained, all questions were answered in detail, mother  verbalized an understanding and was provided with contact information if additional questions arise  REVIEW OF SYSTEMS  Review of Systems   Skin: Positive for wound  Neurological: Positive for seizures  Psychiatric/Behavioral:        Autism         Meds/Allergies     Current Outpatient Medications   Medication Sig Dispense Refill    OXcarbazepine (TRILEPTAL) 600 mg tablet Give 900 mg (1 5 tabs) in the morning and 1200 mg (2 tabs) at night  315 tablet 3    sertraline (ZOLOFT) 100 mg tablet Take 1 tablet (100 mg total) by mouth daily 30 tablet 11    Trokendi  MG CP24 Take 1 pill in the morning with one 200 mg pill for a total morning dose of 300 mg in the morning  30 capsule 11    Trokendi  MG CP24 Take 200 (1 tab) in the morning and 400 mg (2 tabs) at night 90 capsule 11    Vimpat 200 MG tablet Take 400 mg (2 tabs) twice a day 120 tablet 5    diazePAM, 20 MG Dose, (Valtoco 20 MG Dose) 2 x 10 MG/0 1ML LQPK Give one 10 mg spray device in each nostril for total dose of 20 mg as needed for seizures lasting longer than 5 min  (Patient not taking: Reported on 7/21/2021) 4 each 0     No current facility-administered medications for this visit         Allergies   Allergen Reactions    Amoxicillin Hives       PAST HISTORY    Past Medical History:   Diagnosis Date    Autism     Seizures ([facility] [address] )        Past Surgical History:   Procedure Laterality Date    EXTERNAL EAR SURGERY      x2 for boxer's ear    HYDROCELE EXCISION / REPAIR      VAGAL NERVE STIMULATOR (VNS) PLACEMENT         Social History     Tobacco Use    Smoking status: Never Smoker    Smokeless tobacco: Never Used   Substance Use Topics    Alcohol use: Never    Drug use: Never       Family History   Problem Relation Age of Onset    No Known Problems Mother     Arthritis Father     COPD Maternal Grandmother     Heart disease Maternal Grandfather     Arthritis Paternal Grandmother     Heart disease Paternal Grandfather        The following portions of the patient's history were reviewed and updated as appropriate: allergies, current medications, past family history, past medical history, past social history, past surgical history and problem list       EXAM    Vitals:Blood pressure 138/86, pulse 70, temperature 98 7 °F (37 1 °C), temperature source Tympanic, resp  rate 16, height 5' 7" (1 702 m), weight (!) 148 kg (326 lb)  ,Body mass index is 51 06 kg/m²  Physical Exam  Vitals and nursing note reviewed  Exam conducted with a chaperone present (mother )  Constitutional:       General: He is not in acute distress  Appearance: He is not ill-appearing, toxic-appearing or diaphoretic  Comments: Autistic watched movie on phone during entire visit , non verbal, making unintellectual noises    HENT:      Head: Normocephalic  Eyes:      General: No scleral icterus  Right eye: No discharge  Left eye: No discharge  Extraocular Movements: Extraocular movements intact  Conjunctiva/sclera: Conjunctivae normal    Cardiovascular:      Heart sounds: Normal heart sounds  Pulmonary:      Effort: Pulmonary effort is normal  No respiratory distress  Breath sounds: Normal breath sounds  Abdominal:      General: Bowel sounds are normal    Musculoskeletal:         General: Normal range of motion  Cervical back: Normal range of motion  Right lower leg: No edema  Left lower leg: No edema  Skin:     General: Skin is warm and dry  Coloration: Skin is pale  Neurological:      Mental Status: He is alert  Mental status is at baseline  Gait: Gait is intact  Psychiatric:      Comments: Autistic , intellectual delay  Neurologic Exam     Mental Status   Level of consciousness: alert (autistic)    Motor Exam     Strength   Right quadriceps: 5/5  Left quadriceps: 5/5  Right hamstrin/5  Left hamstrin/5  Right anterior tibial: 5/5  Left anterior tibial: 5/5  Left peroneal: 5/5  Right gastroc: 5/5  Left gastroc: 5/5    Gait, Coordination, and Reflexes     Gait  Gait: normal      Imaging Studies  No results found

## 2021-07-22 ENCOUNTER — ANESTHESIA EVENT (OUTPATIENT)
Dept: PERIOP | Facility: HOSPITAL | Age: 31
End: 2021-07-22
Payer: MEDICARE

## 2021-07-23 ENCOUNTER — DOCUMENTATION (OUTPATIENT)
Dept: NEUROSURGERY | Facility: CLINIC | Age: 31
End: 2021-07-23

## 2021-07-23 NOTE — PRE-PROCEDURE INSTRUCTIONS
Pre-Surgery Instructions:   Medication Instructions    OXcarbazepine (TRILEPTAL) 600 mg tablet Patient was instructed by Physician and understands     sertraline (ZOLOFT) 100 mg tablet Patient was instructed by Physician and understands   Trokendi  MG CP24 Patient was instructed by Physician and understands   Trokendi  MG CP24 Patient was instructed by Physician and understands   Vimpat 200 MG tablet Patient was instructed by Physician and understands  Pt mother received all med and shower instructions from surgeons office  Pt had bw done at [facility] and instructed to right fax to [facility]   Pt instructed to bring any equipment related to vns to the hospital on [facility]

## 2021-07-26 ENCOUNTER — HOSPITAL ENCOUNTER (OUTPATIENT)
Facility: HOSPITAL | Age: 31
Setting detail: OUTPATIENT SURGERY
Discharge: HOME | End: 2021-07-26
Attending: NEUROLOGICAL SURGERY | Admitting: NEUROLOGICAL SURGERY
Payer: MEDICARE

## 2021-07-26 ENCOUNTER — ANESTHESIA (OUTPATIENT)
Dept: PERIOP | Facility: HOSPITAL | Age: 31
End: 2021-07-26
Payer: MEDICARE

## 2021-07-26 VITALS
OXYGEN SATURATION: 98 % | DIASTOLIC BLOOD PRESSURE: 54 MMHG | TEMPERATURE: 96.8 F | SYSTOLIC BLOOD PRESSURE: 105 MMHG | RESPIRATION RATE: 20 BRPM | WEIGHT: 315 LBS | HEIGHT: 67 IN | BODY MASS INDEX: 49.44 KG/M2 | HEART RATE: 55 BPM

## 2021-07-26 DIAGNOSIS — Z96.89 S/P PLACEMENT OF VNS (VAGUS NERVE STIMULATION) DEVICE: ICD-10-CM

## 2021-07-26 DIAGNOSIS — Z45.42 BATTERY END OF LIFE OF VAGUS NERVE STIMULATOR: Primary | ICD-10-CM

## 2021-07-26 LAB
ALBUMIN SERPL BCP-MCNC: 3.4 G/DL (ref 3.5–5)
ALP SERPL-CCNC: 133 U/L (ref 46–116)
ALT SERPL W P-5'-P-CCNC: 54 U/L (ref 12–78)
ANION GAP SERPL CALCULATED.3IONS-SCNC: 6 MMOL/L (ref 4–13)
AST SERPL W P-5'-P-CCNC: 22 U/L (ref 5–45)
BASOPHILS # BLD AUTO: 0.01 THOUSANDS/ΜL (ref 0–0.1)
BASOPHILS NFR BLD AUTO: 0 % (ref 0–1)
BILIRUB SERPL-MCNC: 0.31 MG/DL (ref 0.2–1)
BUN SERPL-MCNC: 10 MG/DL (ref 5–25)
CALCIUM ALBUM COR SERPL-MCNC: 8.7 MG/DL (ref 8.3–10.1)
CALCIUM SERPL-MCNC: 8.2 MG/DL (ref 8.3–10.1)
CHLORIDE SERPL-SCNC: 104 MMOL/L (ref 100–108)
CO2 SERPL-SCNC: 21 MMOL/L (ref 21–32)
CREAT SERPL-MCNC: 0.73 MG/DL (ref 0.6–1.3)
EOSINOPHIL # BLD AUTO: 0.14 THOUSAND/ΜL (ref 0–0.61)
EOSINOPHIL NFR BLD AUTO: 3 % (ref 0–6)
ERYTHROCYTE [DISTWIDTH] IN BLOOD BY AUTOMATED COUNT: 11.7 % (ref 11.6–15.1)
GFR SERPL CREATININE-BSD FRML MDRD: 125 ML/MIN/1.73SQ M
GLUCOSE P FAST SERPL-MCNC: 88 MG/DL (ref 65–99)
GLUCOSE SERPL-MCNC: 88 MG/DL (ref 65–140)
HCT VFR BLD AUTO: 42.4 % (ref 36.5–49.3)
HGB BLD-MCNC: 15.1 G/DL (ref 12–17)
IMM GRANULOCYTES # BLD AUTO: 0.01 THOUSAND/UL (ref 0–0.2)
IMM GRANULOCYTES NFR BLD AUTO: 0 % (ref 0–2)
INR PPP: 0.98 (ref 0.84–1.19)
LYMPHOCYTES # BLD AUTO: 1.46 THOUSANDS/ΜL (ref 0.6–4.47)
LYMPHOCYTES NFR BLD AUTO: 36 % (ref 14–44)
MCH RBC QN AUTO: 32.1 PG (ref 26.8–34.3)
MCHC RBC AUTO-ENTMCNC: 35.6 G/DL (ref 31.4–37.4)
MCV RBC AUTO: 90 FL (ref 82–98)
MONOCYTES # BLD AUTO: 0.56 THOUSAND/ΜL (ref 0.17–1.22)
MONOCYTES NFR BLD AUTO: 14 % (ref 4–12)
NEUTROPHILS # BLD AUTO: 1.92 THOUSANDS/ΜL (ref 1.85–7.62)
NEUTS SEG NFR BLD AUTO: 47 % (ref 43–75)
NRBC BLD AUTO-RTO: 0 /100 WBCS
PLATELET # BLD AUTO: 221 THOUSANDS/UL (ref 149–390)
PMV BLD AUTO: 8.5 FL (ref 8.9–12.7)
POTASSIUM SERPL-SCNC: 3.6 MMOL/L (ref 3.5–5.3)
PROT SERPL-MCNC: 6.9 G/DL (ref 6.4–8.2)
PROTHROMBIN TIME: 13 SECONDS (ref 11.6–14.5)
RBC # BLD AUTO: 4.71 MILLION/UL (ref 3.88–5.62)
SODIUM SERPL-SCNC: 131 MMOL/L (ref 136–145)
WBC # BLD AUTO: 4.1 THOUSAND/UL (ref 4.31–10.16)

## 2021-07-26 PROCEDURE — 80053 COMPREHEN METABOLIC PANEL: CPT | Performed by: NEUROLOGICAL SURGERY

## 2021-07-26 PROCEDURE — 85025 COMPLETE CBC W/AUTO DIFF WBC: CPT | Performed by: NEUROLOGICAL SURGERY

## 2021-07-26 PROCEDURE — 95972 ALYS CPLX SP/PN NPGT W/PRGRM: CPT | Performed by: NEUROLOGICAL SURGERY

## 2021-07-26 PROCEDURE — 61885 INSRT/REDO NEUROSTIM 1 ARRAY: CPT | Performed by: NEUROLOGICAL SURGERY

## 2021-07-26 PROCEDURE — 85610 PROTHROMBIN TIME: CPT | Performed by: NEUROLOGICAL SURGERY

## 2021-07-26 PROCEDURE — C1767 GENERATOR, NEURO NON-RECHARG: HCPCS | Performed by: NEUROLOGICAL SURGERY

## 2021-07-26 DEVICE — IMPLANTABLE PULSE GENERATOR
Type: IMPLANTABLE DEVICE | Site: CHEST | Status: FUNCTIONAL
Brand: VNS THERAPY® SENTIVA™ MODEL 1000 GENERATOR

## 2021-07-26 RX ORDER — METHOCARBAMOL 500 MG/1
500 TABLET, FILM COATED ORAL EVERY 6 HOURS PRN
Status: DISCONTINUED | OUTPATIENT
Start: 2021-07-26 | End: 2021-07-26 | Stop reason: HOSPADM

## 2021-07-26 RX ORDER — CLINDAMYCIN PHOSPHATE 900 MG/50ML
900 INJECTION INTRAVENOUS ONCE
Status: DISCONTINUED | OUTPATIENT
Start: 2021-07-26 | End: 2021-07-26 | Stop reason: HOSPADM

## 2021-07-26 RX ORDER — MIDAZOLAM HYDROCHLORIDE 2 MG/2ML
INJECTION, SOLUTION INTRAMUSCULAR; INTRAVENOUS AS NEEDED
Status: DISCONTINUED | OUTPATIENT
Start: 2021-07-26 | End: 2021-07-26

## 2021-07-26 RX ORDER — TRAMADOL HYDROCHLORIDE 50 MG/1
50 TABLET ORAL EVERY 6 HOURS PRN
Status: DISCONTINUED | OUTPATIENT
Start: 2021-07-26 | End: 2021-07-26 | Stop reason: HOSPADM

## 2021-07-26 RX ORDER — CIPROFLOXACIN 500 MG/1
500 TABLET, FILM COATED ORAL EVERY 12 HOURS SCHEDULED
Qty: 10 TABLET | Refills: 0 | Status: SHIPPED | OUTPATIENT
Start: 2021-07-26 | End: 2021-07-31

## 2021-07-26 RX ORDER — PROPOFOL 10 MG/ML
INJECTION, EMULSION INTRAVENOUS AS NEEDED
Status: DISCONTINUED | OUTPATIENT
Start: 2021-07-26 | End: 2021-07-26

## 2021-07-26 RX ORDER — ONDANSETRON 2 MG/ML
4 INJECTION INTRAMUSCULAR; INTRAVENOUS ONCE AS NEEDED
Status: DISCONTINUED | OUTPATIENT
Start: 2021-07-26 | End: 2021-07-26 | Stop reason: HOSPADM

## 2021-07-26 RX ORDER — CLINDAMYCIN PHOSPHATE 900 MG/50ML
INJECTION INTRAVENOUS AS NEEDED
Status: DISCONTINUED | OUTPATIENT
Start: 2021-07-26 | End: 2021-07-26

## 2021-07-26 RX ORDER — LIDOCAINE HYDROCHLORIDE AND EPINEPHRINE 10; 10 MG/ML; UG/ML
INJECTION, SOLUTION INFILTRATION; PERINEURAL AS NEEDED
Status: DISCONTINUED | OUTPATIENT
Start: 2021-07-26 | End: 2021-07-26 | Stop reason: HOSPADM

## 2021-07-26 RX ORDER — ACETAMINOPHEN 325 MG/1
975 TABLET ORAL EVERY 8 HOURS PRN
Status: DISCONTINUED | OUTPATIENT
Start: 2021-07-26 | End: 2021-07-26 | Stop reason: HOSPADM

## 2021-07-26 RX ORDER — SODIUM CHLORIDE, SODIUM LACTATE, POTASSIUM CHLORIDE, CALCIUM CHLORIDE 600; 310; 30; 20 MG/100ML; MG/100ML; MG/100ML; MG/100ML
20 INJECTION, SOLUTION INTRAVENOUS CONTINUOUS
Status: DISCONTINUED | OUTPATIENT
Start: 2021-07-26 | End: 2021-07-26 | Stop reason: HOSPADM

## 2021-07-26 RX ORDER — PROPOFOL 10 MG/ML
INJECTION, EMULSION INTRAVENOUS CONTINUOUS PRN
Status: DISCONTINUED | OUTPATIENT
Start: 2021-07-26 | End: 2021-07-26

## 2021-07-26 RX ORDER — LIDOCAINE HYDROCHLORIDE 10 MG/ML
0.5 INJECTION, SOLUTION EPIDURAL; INFILTRATION; INTRACAUDAL; PERINEURAL ONCE AS NEEDED
Status: DISCONTINUED | OUTPATIENT
Start: 2021-07-26 | End: 2021-07-26 | Stop reason: HOSPADM

## 2021-07-26 RX ORDER — SODIUM CHLORIDE, SODIUM LACTATE, POTASSIUM CHLORIDE, CALCIUM CHLORIDE 600; 310; 30; 20 MG/100ML; MG/100ML; MG/100ML; MG/100ML
125 INJECTION, SOLUTION INTRAVENOUS CONTINUOUS
Status: DISCONTINUED | OUTPATIENT
Start: 2021-07-26 | End: 2021-07-26 | Stop reason: HOSPADM

## 2021-07-26 RX ORDER — SODIUM CHLORIDE, SODIUM LACTATE, POTASSIUM CHLORIDE, CALCIUM CHLORIDE 600; 310; 30; 20 MG/100ML; MG/100ML; MG/100ML; MG/100ML
INJECTION, SOLUTION INTRAVENOUS CONTINUOUS PRN
Status: DISCONTINUED | OUTPATIENT
Start: 2021-07-26 | End: 2021-07-26

## 2021-07-26 RX ORDER — ONDANSETRON 2 MG/ML
4 INJECTION INTRAMUSCULAR; INTRAVENOUS EVERY 6 HOURS PRN
Status: DISCONTINUED | OUTPATIENT
Start: 2021-07-26 | End: 2021-07-26 | Stop reason: HOSPADM

## 2021-07-26 RX ORDER — ONDANSETRON 2 MG/ML
INJECTION INTRAMUSCULAR; INTRAVENOUS AS NEEDED
Status: DISCONTINUED | OUTPATIENT
Start: 2021-07-26 | End: 2021-07-26

## 2021-07-26 RX ORDER — FENTANYL CITRATE 50 UG/ML
INJECTION, SOLUTION INTRAMUSCULAR; INTRAVENOUS AS NEEDED
Status: DISCONTINUED | OUTPATIENT
Start: 2021-07-26 | End: 2021-07-26

## 2021-07-26 RX ORDER — SODIUM CHLORIDE 9 MG/ML
100 INJECTION, SOLUTION INTRAVENOUS CONTINUOUS
Status: DISCONTINUED | OUTPATIENT
Start: 2021-07-26 | End: 2021-07-26 | Stop reason: HOSPADM

## 2021-07-26 RX ORDER — FENTANYL CITRATE/PF 50 MCG/ML
25 SYRINGE (ML) INJECTION
Status: DISCONTINUED | OUTPATIENT
Start: 2021-07-26 | End: 2021-07-26 | Stop reason: HOSPADM

## 2021-07-26 RX ADMIN — FENTANYL CITRATE 25 MCG: 50 INJECTION INTRAMUSCULAR; INTRAVENOUS at 11:11

## 2021-07-26 RX ADMIN — ONDANSETRON 4 MG: 2 INJECTION INTRAMUSCULAR; INTRAVENOUS at 11:21

## 2021-07-26 RX ADMIN — PROPOFOL 30 MG: 10 INJECTION, EMULSION INTRAVENOUS at 11:06

## 2021-07-26 RX ADMIN — SODIUM CHLORIDE, SODIUM LACTATE, POTASSIUM CHLORIDE, AND CALCIUM CHLORIDE: .6; .31; .03; .02 INJECTION, SOLUTION INTRAVENOUS at 10:41

## 2021-07-26 RX ADMIN — SODIUM CHLORIDE 0.6 MCG/KG/HR: 900 INJECTION INTRAVENOUS at 11:06

## 2021-07-26 RX ADMIN — CLINDAMYCIN PHOSPHATE 900 MG: 900 INJECTION, SOLUTION INTRAVENOUS at 11:11

## 2021-07-26 RX ADMIN — PROPOFOL 50 MCG/KG/MIN: 10 INJECTION, EMULSION INTRAVENOUS at 11:06

## 2021-07-26 RX ADMIN — MIDAZOLAM 1 MG: 1 INJECTION INTRAMUSCULAR; INTRAVENOUS at 10:58

## 2021-07-26 NOTE — INTERVAL H&P NOTE
H&P reviewed  After examining the patient I find no changes in the patients condition since the H&P had been written  Patient personally seen and examined  Neurological examination unchanged compared to last office/progress note, with the following exceptions:    /89   Pulse 74   Temp 98 4 °F (36 9 °C) (Tympanic)   Resp 20   Ht 5' 7" (1 702 m)   Wt (!) 148 kg (326 lb)   SpO2 97%   BMI 51 06 kg/m²      None  Regular cardiac rate and rhythm  No respiratory distress  Abdomen nontender  Normocephalic  Obeys commands in all limbs  Has stopped all antiplatelet/anticoagulation medication as instructed  Post operative instructions and medications have been reviewed with the patient and family  Assessment and Plan:    All questions have been answered to the patient and family satisfaction  Plan to proceed with reopening of left anterior chest incision for replacement of vagal nerve stimulator implantable pulse generator  They are in agreement with proceeding  At present, the VNS IPG is still en route via   Unfortunate this will result in a delay of the case  Will keep the patient and his mother posted on further progress throughout the day  All her questions were answered to her satisfaction

## 2021-07-26 NOTE — ANESTHESIA PREPROCEDURE EVALUATION
Procedure:  REOPENING OF LEFT CHEST INCISION FOR REPLACEMENT OF IMPLANTABLE PULSE GENERATOR VAGUS NERVE STIMULATOR (Left Chest)    Relevant Problems   ANESTHESIA  remote history of nausea as a child      NEURO/PSYCH   (+) Epilepsy ([address] ) (On multiple medications and vagus nerve stim    When has seizures develops Laurent's paralysis with vomiting)      PULMONARY  Received covid vaccine in April      Other   (+) Autism   (+) Battery end of life of vagus nerve stimulator   (+) Obesity, morbid (HCC)   (+) S/P placement of VNS (vagus nerve stimulation) device        Physical Exam    Airway    Mallampati score: III  TM Distance: >3 FB  Neck ROM: full     Dental   No notable dental hx     Cardiovascular  Rhythm: regular, Rate: normal,     Pulmonary  Decreased breath sounds,     Other Findings        Anesthesia Plan  ASA Score- 3     Anesthesia Type- IV sedation with anesthesia with ASA Monitors  Additional Monitors:   Airway Plan:           Plan Factors-Exercise tolerance (METS): >4 METS  Chart reviewed  EKG reviewed  Existing labs reviewed  Patient summary reviewed  Patient is not a current smoker  Induction- intravenous  Postoperative Plan- Plan for postoperative opioid use  Informed Consent- Anesthetic plan and risks discussed with mother  I personally reviewed this patient with the CRNA  Discussed and agreed on the Anesthesia Plan with the CRNA  Debra Prince

## 2021-07-26 NOTE — ANESTHESIA POSTPROCEDURE EVALUATION
Post-Op Assessment Note    CV Status:  Stable  Pain Score: 0    Pain management: adequate     Mental Status:  Sleepy   Hydration Status:  Stable   PONV Controlled:  None   Airway Patency:  Patent      Post Op Vitals Reviewed: Yes      Staff: Anesthesiologist, CRNA         No complications documented      BP (P) 99/50 (07/26/21 1147)    Temp (P) 97 8 °F (36 6 °C) (07/26/21 1147)    Pulse     Resp (P) 20 (07/26/21 1147)    SpO2

## 2021-07-26 NOTE — DISCHARGE INSTRUCTIONS
Vagal Nerve Stimulator Discharge Instructions    Activity:    1  Do not lift more than 20 pounds for 2 weeks  2  Avoid bending, lifting and twisting for 2 weeks  Surgical incision care:    1  Keep dressings in place for 3 days  2  Keep incisions dry for 3 days  3  May allow clean water to flow over incisions after 3 days  4  Do not immerse the incisions in water for 4 weeks  5  After 3 days, incisions may be left open to air, but should remain clean  6  Do not apply any creams or ointments to the incision, unless otherwise instructed by [facility]  7  Contact office if increasing redness, drainage, pain or swelling around the incisions  8  Complete upright xray of thoracic and lumbar spine prior to 6 week post operative appointment  Postoperative medication:    1  [employer] will not provide pain medication for the first 2 weeks after surgery as coordinated with your pain specialist    2  If [employer] is providing pain medication, please contact office for questions regarding dosage and modifications  3  If [facility] is not providing pain medication postoperatively, then contact pain specialist for additional instructions and prescriptions  4  No antiplatelet or anticoagulation medication for 2 weeks after surgery, unless otherwise instructed  Please contact [facility] if you have any questions about the effects of any of your medications on blood clotting  5  Do not operate heavy machinery or vehicles while taking sedating medications  *Note that it may take some time for the stimulator to improve chronic pain symptoms  Adjustment of the stimulator program can begin 2 weeks after placement  Please contact the stimulator representative if you have any questions regarding programing

## 2021-07-26 NOTE — OP NOTE
OPERATIVE REPORT  PATIENT NAME: Markel Mejía    :  1990  MRN: [id number]  Pt Location:  OR ROOM 17    SURGERY DATE: 2021    Surgeon(s) and Role:     * Elisha Em MD - Primary  No assistant  No qualified residents available  Preop Diagnosis:  Intractable epilepsy without status epilepticus, unspecified epilepsy type ([facility] ) [G40 919]  S/P placement of VNS (vagus nerve stimulation) device [Z96 89]  Battery end of life of vagus nerve stimulator [Z45 42]    Post-Op Diagnosis Codes:     * Intractable epilepsy without status epilepticus, unspecified epilepsy type ([facility] ) [G40 919]     * S/P placement of VNS (vagus nerve stimulation) device [Z96 89]     * Battery end of life of vagus nerve stimulator [Z45 42]    Procedure:  1  Reopening of left chest incision for replacement of Sentiva vagal nerve stimulator implantable pulse generator  (#[id number])    Specimen(s):  * No specimens in log *    Estimated Blood Loss:   Minimal    Drains:  * No LDAs found *    Anesthesia Type:   IV Sedation with Anesthesia    Operative Indications:  Intractable epilepsy without status epilepticus, unspecified epilepsy type ([facility] ) [G40 919]  S/P placement of VNS (vagus nerve stimulation) device [Z96 89]  Battery end of life of vagus nerve stimulator [Z45 42]    Operative Findings:  Intraoperative interrogation demonstrated new system working within expected parameters  Complications:   None    Procedure and Technique:  Clinical Note:    The goals and alternatives to the procedure described above were discussed with patient  Surgery is intended to reproduce the results of spinal cord stimulator trial   Weakness, numbness and back pain are less likely to improve  The risks of surgery were described in detail  1  Risk of IV anesthetic  There is risk of infection and bleeding  2  Risk of system malfunction and failure of treatment  Need for revision surgery      Once all questions were answered to their satisfaction, they asked to proceed with surgery  Operating Room Note    The patient was brought to the operating room and asked to lie supine on the  OR gurney  Care was taken to ensure that all pressure points were padded and the neck was in a neutral position  The left anterior chest incision was identified and the skin was prepped and draped in usual sterile fashion  A full surgical time-out was used to identify the site, side and type of surgery  It was also confirmed that the patient received preoperative antibiotic  The planned incision was then infiltrated 1% lidocaine with 100,000 epinephrine  Ten blade was used to incise the skin over the planned buttock incision  Monopolar cautery was used to dissect through the subcutaneous tissue and identify the IPG pocket  The IPG was then removed from the pocket and disconnected from the leads  The distal portion of the electrode was connected to the new generator  Excess lead and generator was then placed in the pocket and secured in position with silk suture  The system was then interrogated and was noted to be working within normal limits and impedances  The incision was irrigated with antibiotic irrigation  Bipolar cautery was used for further hemostasis  Inverted Vicryl suture was used to approximate the subcutaneous tissues  Running Monocryl was then used to close the incision  A Miplex was applied to the incision  The count was correct at the end of the case and there were no complications  The patient was transferred to the PACU where they were noted to be hemodynamically stable and neurologically unchanged  The results of surgery were discussed with patient and family  The patient underwent electronic analysis and complex programming of the vagal nerve stimulator system with the representatives  N 2 25 mA; 20 Hz, 250 usec,14 sec, 0 3 min, 56%; M 2 5 mA; 250 usec, 60 sec      I was present for the entire procedure    Patient Disposition:  PACU SIGNATURE: Nasima Overton MD  DATE: July 26, 2021  TIME: 11:46 AM

## 2021-07-27 ENCOUNTER — TELEMEDICINE (OUTPATIENT)
Dept: NEUROSURGERY | Facility: CLINIC | Age: 31
End: 2021-07-27

## 2021-07-27 DIAGNOSIS — Z98.890 STATUS POST SURGERY: Primary | ICD-10-CM

## 2021-07-27 PROCEDURE — 99024 POSTOP FOLLOW-UP VISIT: CPT

## 2021-07-27 NOTE — TELEPHONE ENCOUNTER
Spoke to Caleb Do  Informed of previous  Verbalized understanding  She will notify office if any issues regarding device

## 2021-07-27 NOTE — PROGRESS NOTES
Virtual Brief Visit    Verification of patient location:    Patient is located in the following state in which I hold an active license [place]      Assessment/Plan:    Problem List Items Addressed This Visit     None      Visit Diagnoses     Status post surgery    -  Primary                Reason for visit is   Chief Complaint   Patient presents with    Virtual Brief Visit        Encounter provider Patrica Michael RN    Provider located at [address] [place] [address]  [address] [facility] [address]  [place] [id number]    Recent Visits  No visits were found meeting these conditions  Showing recent visits within past 7 days and meeting all other requirements  Today's Visits  Date Type Provider Dept   07/27/21 Telemedicine Patrica Michael RN [facility]   Showing today's visits and meeting all other requirements  Future Appointments  No visits were found meeting these conditions  Showing future appointments within next 150 days and meeting all other requirements       After connecting through telephone, the patient was identified by name and date of birth  Sukindsae Dinaarthur Arnett was informed that this is a telemedicine visit and that the visit is being conducted through telephone  My office door was closed  No one else was in the room  He acknowledged consent and understanding of privacy and security of the platform  The patient has agreed to participate and understands he can discontinue the visit at any time  Patient is aware this is a billable service  Subjective    Hamzah Arnett is a 35 y o  male s/p: Reopening of left chest incision for replacement of Sentiva vagal nerve stimulator implantable pulse generator  Called patient to see how he is doing after surgery and spoke with his father  He reports he is doing well overall and denies any incisional issues or fevers  Reviewed incision care with the patient's father   Advised that after three days he may take a shower and gently wash the surgical site with soap and water  Use clean wash cloth, towels, and clothing  Do not submerge in water until cleared by the surgeon  Do not apply any creams, ointments, or lotions to the site  Father is aware to call the office if any redness, swelling, drainage, dehiscence of incision, or fever >100 F occurs  Father is aware to call the office if any concerns or questions may arise  Reminded him of his upcoming appointment with the date/time/location  Father was appreciative for the call  Past Medical History:   Diagnosis Date    Autism     Seizures ([address] )        Past Surgical History:   Procedure Laterality Date    EXTERNAL EAR SURGERY      x2 for boxer's ear    HYDROCELE EXCISION / REPAIR      HI IMP STIM,CRANIAL,SUBQ,1 ARRAY Left 7/26/2021    Procedure: REOPENING OF LEFT CHEST INCISION FOR REPLACEMENT OF IMPLANTABLE PULSE GENERATOR VAGUS NERVE STIMULATOR;  Surgeon: Tia Lin MD;  [facility]: [facility];  Service: Neurosurgery    VAGAL NERVE STIMULATOR (VNS) PLACEMENT         Current Outpatient Medications   Medication Sig Dispense Refill    ciprofloxacin (CIPRO) 500 mg tablet Take 1 tablet (500 mg total) by mouth every 12 (twelve) hours for 5 days 10 tablet 0    diazePAM, 20 MG Dose, (Valtoco 20 MG Dose) 2 x 10 MG/0 1ML LQPK Give one 10 mg spray device in each nostril for total dose of 20 mg as needed for seizures lasting longer than 5 min  4 each 0    OXcarbazepine (TRILEPTAL) 600 mg tablet Give 900 mg (1 5 tabs) in the morning and 1200 mg (2 tabs) at night  315 tablet 3    sertraline (ZOLOFT) 100 mg tablet Take 1 tablet (100 mg total) by mouth daily 30 tablet 11    Trokendi  MG CP24 Take 1 pill in the morning with one 200 mg pill for a total morning dose of 300 mg in the morning   30 capsule 11    Trokendi  MG CP24 Take 200 (1 tab) in the morning and 400 mg (2 tabs) at night 90 capsule 11    Vimpat 200 MG tablet Take 400 mg (2 tabs) twice a day 120 tablet 5     No current facility-administered medications for this visit  Allergies   Allergen Reactions    Amoxicillin Hives       Review of Systems    There were no vitals filed for this visit  VIRTUAL VISIT DISCLAIMER      Hamzah Benitez verbally agrees to participate in [employer]  Pt is aware that [employer] could be limited without vital signs or the ability to perform a full hands-on physical exam  Hamzah Benitez understands he or the provider may request at any time to terminate the video visit and request the patient to seek care or treatment in person

## 2021-07-27 NOTE — TELEPHONE ENCOUNTER
He just had his generator replaced, not a new implant  I checked the Op note and they turned the device back to his prior settings, so no need to see him back sooner than his regular scheduled appointment

## 2021-08-16 ENCOUNTER — OFFICE VISIT (OUTPATIENT)
Dept: NEUROSURGERY | Facility: CLINIC | Age: 31
End: 2021-08-16

## 2021-08-16 VITALS
BODY MASS INDEX: 49.44 KG/M2 | TEMPERATURE: 98.4 F | RESPIRATION RATE: 18 BRPM | DIASTOLIC BLOOD PRESSURE: 70 MMHG | SYSTOLIC BLOOD PRESSURE: 120 MMHG | HEART RATE: 70 BPM | HEIGHT: 67 IN | WEIGHT: 315 LBS

## 2021-08-16 DIAGNOSIS — Z45.42 BATTERY END OF LIFE OF VAGUS NERVE STIMULATOR: Primary | ICD-10-CM

## 2021-08-16 DIAGNOSIS — G40.919 INTRACTABLE EPILEPSY WITHOUT STATUS EPILEPTICUS, UNSPECIFIED EPILEPSY TYPE (HCC): ICD-10-CM

## 2021-08-16 PROCEDURE — 99024 POSTOP FOLLOW-UP VISIT: CPT | Performed by: NURSE PRACTITIONER

## 2021-08-16 NOTE — ASSESSMENT & PLAN NOTE
· As addressed in HPI  · 2 weeks PO presents for aftercare to assess surgical incision care and provided continues instructions for postoperative care  · Left upper chest incision, resolving ecchymosis of surrounding soft tissue , skin closure with 4 0 running Monocryl suture, incision is clean, dry and intact, without erythema, dehiscence, drainage or s/s of infection, healing well with approximated wound edge  · Photo attachment of incision  · Had 1 seizure last week which mother reports was likely related to his change in routine while on vacation  He has not had a seizure in about 2 months  · Advises mother if patient has another seizure , contact dr Salvador Stevenson to inform, he does not have a f/u visit until October 2021   · Mother reports calling Dr Sera Alba office after she received surgery and 2 week postop visit date , they cancelled September visit rescheduled for October  PLAN  · Instructions for continued postoperative care , documented in AVS, reviewed with mother and copy provided

## 2021-08-16 NOTE — PATIENT INSTRUCTIONS
VNS GENERATOR REPLACEMENT  · The following instructions continue thru next 4 weeks (6-week post op)     · At 2 weeks postop can resume restricted medications such as ASA, products containing ASA, NSAID, fish oils, and OTC products or as previously directed  · Resume driving 2 week postoperatively---does not drive   · Continue to observe incisions for redness, drainage, swelling, dehiscence (open), increased pain, chills,fever >/= 101, warmth to touch incision or skin surrounding, if occur call or report to office immediately for reassessment  · Do not apply anything to incision lotions, oils, ointment, creams, antibacterial ointments etc  to incisions  ·   · Continue showers using clean towel and wash cloth with OTC antibacterial/liquid body wash, pat dry after showering continue protocol for an additional 2 weeks  · Avoid immersion in water no swimming, hot tub use, or tub bath    · Do not remove glue from incisions, adherent to incision line covering scabs will eventually disappear  Allow water to briefly run over incisions, do not rub incisions  · Postoperative activity restrictions were reviewed, follow the basic "BLTs" no bending, no lifting greater than 10 lbs  , no twisting, and no stretching thru 6 weeks post op  · Avoid repetitive movement of upper extremity on operative side pushing, pulling, rotation, lifting greater than 10 lbs  · Ambulate as tolerated immediately post op  · If there is any significant change in neurologic status, call and/or return to the office immediately for reassessment--October 2021

## 2021-08-16 NOTE — PROGRESS NOTES
Assessment/Plan:    Intractable epilepsy without status epilepticus ([facility] )  · As addressed in HPI  · As addressed in VNS battery EOl    Battery end of life of vagus nerve stimulator  · As addressed in HPI  · 2 weeks PO presents for aftercare to assess surgical incision care and provided continues instructions for postoperative care  · Left upper chest incision, resolving ecchymosis of surrounding soft tissue , skin closure with 4 0 running Monocryl suture, incision is clean, dry and intact, without erythema, dehiscence, drainage or s/s of infection, healing well with approximated wound edge  · Photo attachment of incision  · Had 1 seizure last week which mother reports was likely related to his change in routine while on vacation  He has not had a seizure in about 2 months  · Advises mother if patient has another seizure , contact dr Seth James to inform, he does not have a f/u visit until October 2021   · Mother reports calling Dr Luaro Sosa office after she received surgery and 2 week postop visit date , they cancelled September visit rescheduled for October  PLAN  · Instructions for continued postoperative care , documented in AVS, reviewed with mother and copy provided  Subjective: 2 weeks after VNS generator replacement     Patient ID: Aj Orn  Render Figures is a 35 y o  male     HPI   Has a longstanding history of epilepsy since age 13 and is autistic  He has a history of developmental delay ane obesity      Requires dual therapy for seizure control aED and Vagal Nerve Stimulator (VNS)  Implantation of Vagal nerve stimulator 9/15/2011 with left upper chest generator   , a age 24 at [address]  Dr Seth James manages his AED and VNS system  Interrogation of VNS 7/1/2021 revealed generator nearing end of service , need replacement      Underwent surgery 7/26/2021 performed by Dr Liz Phipps STIMULATOR (Left Chest)  Is now 2 weeks status post surgery  Impressions and treatment recommendations were discussed in detail with the patient who verbalized understanding, all questions were answered and contact information was given in the event future questions arise  REVIEW OF SYSTEMS  Review of Systems   Skin: Positive for wound (surgical incision)  Neurological: Positive for seizures (last seizure, last week)  Psychiatric/Behavioral:        Autism   All other systems reviewed and are negative  Meds/Allergies     Current Outpatient Medications   Medication Sig Dispense Refill    OXcarbazepine (TRILEPTAL) 600 mg tablet Give 900 mg (1 5 tabs) in the morning and 1200 mg (2 tabs) at night  315 tablet 3    sertraline (ZOLOFT) 100 mg tablet Take 1 tablet (100 mg total) by mouth daily 30 tablet 11    Trokendi  MG CP24 Take 1 pill in the morning with one 200 mg pill for a total morning dose of 300 mg in the morning  30 capsule 11    Trokendi  MG CP24 Take 200 (1 tab) in the morning and 400 mg (2 tabs) at night 90 capsule 11    Vimpat 200 MG tablet Take 400 mg (2 tabs) twice a day 120 tablet 5    diazePAM, 20 MG Dose, (Valtoco 20 MG Dose) 2 x 10 MG/0 1ML LQPK Give one 10 mg spray device in each nostril for total dose of 20 mg as needed for seizures lasting longer than 5 min  (Patient not taking: Reported on 8/16/2021) 4 each 0     No current facility-administered medications for this visit         Allergies   Allergen Reactions    Amoxicillin Hives       PAST HISTORY    Past Medical History:   Diagnosis Date    Autism     Seizures ([address] )        Past Surgical History:   Procedure Laterality Date    EXTERNAL EAR SURGERY      x2 for boxer's ear    HYDROCELE EXCISION / REPAIR      ID IMP STIM,CRANIAL,SUBQ,1 ARRAY Left 7/26/2021    Procedure: REOPENING OF LEFT CHEST INCISION FOR REPLACEMENT OF IMPLANTABLE PULSE GENERATOR VAGUS NERVE STIMULATOR;  Surgeon: Tia Lin MD; Location: [facility];  Service: Neurosurgery    VAGAL NERVE STIMULATOR (VNS) PLACEMENT         Social History     Tobacco Use    Smoking status: Never Smoker    Smokeless tobacco: Never Used   Vaping Use    Vaping Use: Never assessed   Substance Use Topics    Alcohol use: Never    Drug use: Never       Family History   Problem Relation Age of Onset    No Known Problems Mother     Arthritis Father     COPD Maternal Grandmother     Heart disease Maternal Grandfather     Arthritis Paternal Grandmother     Heart disease Paternal Grandfather        The following portions of the patient's history were reviewed and updated as appropriate: allergies, current medications, past family history, past medical history, past social history, past surgical history and problem list       EXAM    Vitals:Blood pressure 120/70, pulse 70, temperature 98 4 °F (36 9 °C), temperature source Tympanic, resp  rate 18, height 5' 7" (1 702 m), weight (!) 147 kg (325 lb)  ,Body mass index is 50 9 kg/m²  Physical Exam  Vitals and nursing note reviewed  Exam conducted with a chaperone present (Mother)  Constitutional:       Comments: Morbid obesity BMI 50 9    HENT:      Head: Normocephalic and atraumatic  Eyes:      General: No scleral icterus  Right eye: No discharge  Left eye: No discharge  Cardiovascular:      Rate and Rhythm: Normal rate and regular rhythm  Pulmonary:      Effort: Pulmonary effort is normal  No respiratory distress  Skin:     Coloration: Skin is pale  Neurological:      General: No focal deficit present  Mental Status: He is alert  Gait: Gait is intact  Comments: Speech dysarthric   Psychiatric:      Comments: Autistic, developmental delay          Neurologic Exam     Mental Status   Level of consciousness: alert  Alert but not interactive , mother must give him verbal cues to respond       Gait, Coordination, and Reflexes     Gait  Gait: normal      Imaging Studies  No results found

## 2021-10-21 ENCOUNTER — OFFICE VISIT (OUTPATIENT)
Dept: NEUROLOGY | Facility: CLINIC | Age: 31
End: 2021-10-21
Payer: MEDICARE

## 2021-10-21 VITALS
HEIGHT: 67 IN | SYSTOLIC BLOOD PRESSURE: 130 MMHG | DIASTOLIC BLOOD PRESSURE: 80 MMHG | WEIGHT: 315 LBS | BODY MASS INDEX: 49.44 KG/M2

## 2021-10-21 DIAGNOSIS — G40.919 INTRACTABLE EPILEPSY WITHOUT STATUS EPILEPTICUS, UNSPECIFIED EPILEPSY TYPE (HCC): ICD-10-CM

## 2021-10-21 DIAGNOSIS — Z96.89 S/P PLACEMENT OF VNS (VAGUS NERVE STIMULATION) DEVICE: Primary | ICD-10-CM

## 2021-10-21 PROBLEM — Z45.42 BATTERY END OF LIFE OF VAGUS NERVE STIMULATOR: Status: RESOLVED | Noted: 2021-07-21 | Resolved: 2021-10-21

## 2021-10-21 PROCEDURE — 95970 ALYS NPGT W/O PRGRMG: CPT | Performed by: PSYCHIATRY & NEUROLOGY

## 2021-10-21 PROCEDURE — 99214 OFFICE O/P EST MOD 30 MIN: CPT | Performed by: PSYCHIATRY & NEUROLOGY

## 2021-10-21 RX ORDER — LACOSAMIDE 200 MG/1
TABLET, FILM COATED ORAL
Qty: 120 TABLET | Refills: 5 | Status: SHIPPED | OUTPATIENT
Start: 2021-10-21 | End: 2022-04-07 | Stop reason: SDUPTHER

## 2021-10-21 RX ORDER — TOPIRAMATE 100 MG/1
CAPSULE, EXTENDED RELEASE ORAL
Qty: 30 CAPSULE | Refills: 11 | Status: SHIPPED | OUTPATIENT
Start: 2021-10-21 | End: 2022-04-07 | Stop reason: SDUPTHER

## 2021-10-21 RX ORDER — OXCARBAZEPINE 600 MG/1
TABLET, FILM COATED ORAL
Qty: 315 TABLET | Refills: 3 | Status: SHIPPED | OUTPATIENT
Start: 2021-10-21 | End: 2022-04-07 | Stop reason: SDUPTHER

## 2021-10-21 RX ORDER — TOPIRAMATE 200 MG/1
CAPSULE, EXTENDED RELEASE ORAL
Qty: 90 CAPSULE | Refills: 11 | Status: SHIPPED | OUTPATIENT
Start: 2021-10-21 | End: 2022-04-07 | Stop reason: SDUPTHER

## 2022-01-14 DIAGNOSIS — G40.919 INTRACTABLE EPILEPSY WITHOUT STATUS EPILEPTICUS, UNSPECIFIED EPILEPSY TYPE (HCC): ICD-10-CM

## 2022-01-14 RX ORDER — SERTRALINE HYDROCHLORIDE 100 MG/1
TABLET, FILM COATED ORAL
Qty: 30 TABLET | Refills: 11 | Status: SHIPPED | OUTPATIENT
Start: 2022-01-14

## 2022-02-17 ENCOUNTER — TELEPHONE (OUTPATIENT)
Dept: NEUROLOGY | Facility: CLINIC | Age: 32
End: 2022-02-17

## 2022-02-17 NOTE — TELEPHONE ENCOUNTER
Patient's mother calling in stating they had a change of pharmacy  [facility] has closed and will now be using [facility] in [place], [place]  They do not need any refills at this time but just wanted to make us aware for future prescriptions       Updated preferred pharmacy in patient chart

## 2022-04-07 ENCOUNTER — OFFICE VISIT (OUTPATIENT)
Dept: NEUROLOGY | Facility: CLINIC | Age: 32
End: 2022-04-07
Payer: MEDICARE

## 2022-04-07 VITALS
DIASTOLIC BLOOD PRESSURE: 82 MMHG | HEIGHT: 67 IN | BODY MASS INDEX: 49.44 KG/M2 | WEIGHT: 315 LBS | SYSTOLIC BLOOD PRESSURE: 140 MMHG

## 2022-04-07 DIAGNOSIS — G40.919 INTRACTABLE EPILEPSY WITHOUT STATUS EPILEPTICUS, UNSPECIFIED EPILEPSY TYPE (HCC): ICD-10-CM

## 2022-04-07 DIAGNOSIS — Z96.89 S/P PLACEMENT OF VNS (VAGUS NERVE STIMULATION) DEVICE: Primary | ICD-10-CM

## 2022-04-07 PROCEDURE — 95970 ALYS NPGT W/O PRGRMG: CPT | Performed by: PSYCHIATRY & NEUROLOGY

## 2022-04-07 PROCEDURE — 99214 OFFICE O/P EST MOD 30 MIN: CPT | Performed by: PSYCHIATRY & NEUROLOGY

## 2022-04-07 RX ORDER — TOPIRAMATE 100 MG/1
CAPSULE, EXTENDED RELEASE ORAL
Qty: 30 CAPSULE | Refills: 11 | Status: SHIPPED | OUTPATIENT
Start: 2022-04-07

## 2022-04-07 RX ORDER — OXCARBAZEPINE 600 MG/1
TABLET, FILM COATED ORAL
Qty: 315 TABLET | Refills: 3 | Status: SHIPPED | OUTPATIENT
Start: 2022-04-07

## 2022-04-07 RX ORDER — LEVETIRACETAM 500 MG/1
TABLET ORAL
Qty: 120 TABLET | Refills: 11 | Status: SHIPPED | OUTPATIENT
Start: 2022-04-07

## 2022-04-07 RX ORDER — TOPIRAMATE 200 MG/1
CAPSULE, EXTENDED RELEASE ORAL
Qty: 90 CAPSULE | Refills: 11 | Status: SHIPPED | OUTPATIENT
Start: 2022-04-07

## 2022-04-07 RX ORDER — LACOSAMIDE 200 MG/1
TABLET, FILM COATED ORAL
Qty: 120 TABLET | Refills: 5 | Status: SHIPPED | OUTPATIENT
Start: 2022-04-07

## 2022-04-07 NOTE — ASSESSMENT & PLAN NOTE
I did interrogate his VNS today  It is functioning well in battery life is adequate  It did fall into the next lower range, which is likely due to his high duty cycle    Will be important to continue to monitor this going forward

## 2022-04-07 NOTE — PATIENT INSTRUCTIONS
-- Please start Levetiracetam (Keppra) 1 tab (500 mg) twice a day for 1 week, then increase to 2 tabs (1000 mg) twice a day    -- When he is at the full dose of Levetiracetam for 1 week, please decrease his Oxcarbazepine (Trileptal) to be 900 mg twice a day

## 2022-04-07 NOTE — ASSESSMENT & PLAN NOTE
He has continued to have seizures despite current doses of his medications  I again discussed that he has had maximal doses of his current medications, so would likely benefit from adding another medicine  In general I would like to try to avoid having him on excessive number of medications, so by starting a new medicine, I will likely plan on trying to reduce 1 of his other medications  --I will have him start taking levetiracetam 500 mg twice a day for 1 week, then increase to be 1000 mg twice a day  I discussed the risks and rationale including potential side effects of this medication  If he would have any side effects including irritability with this medicine, they will give our office a call  --when he is at the goal dose of levetiracetam, I will have him decrease his oxcarbazepine be 900 mg twice a day      --I will keep track of his seizures for his next appointment

## 2022-04-07 NOTE — PROGRESS NOTES
Patient ID: Noah Andrews is a 28 y o  male with autism and intractable epilepsy, s/p VNS implantation, who is returning to Neurology office for follow up of his seizures  Assessment/Plan:    Intractable epilepsy without status epilepticus ([facility] )  He has continued to have seizures despite current doses of his medications  I again discussed that he has had maximal doses of his current medications, so would likely benefit from adding another medicine  In general I would like to try to avoid having him on excessive number of medications, so by starting a new medicine, I will likely plan on trying to reduce 1 of his other medications  --I will have him start taking levetiracetam 500 mg twice a day for 1 week, then increase to be 1000 mg twice a day  I discussed the risks and rationale including potential side effects of this medication  If he would have any side effects including irritability with this medicine, they will give our office a call  --when he is at the goal dose of levetiracetam, I will have him decrease his oxcarbazepine be 900 mg twice a day  --I will keep track of his seizures for his next appointment    S/P placement of VNS (vagus nerve stimulation) device  I did interrogate his VNS today  It is functioning well in battery life is adequate  It did fall into the next lower range, which is likely due to his high duty cycle  Will be important to continue to monitor this going forward        He will Return in about 3 months (around 7/7/2022)  Subjective:    HPI  Current seizure medications:  1  Oxcarbazepine 900 mg in the am and 1200 mg in the pm  2  Vimpat 400 mg twice a day  3  Trokendi 300 mg in the am and 400 mg in the pm  Other medications as per Epic  VNS Settings (interrogated today  Sentiva S/N [id number]   Implanted on 7/26/2021)  Output current 2 25 mA   Signal frequency 20 Hz   Pulse width 250 ?sec   On time 14 sec   Off time 0 3 min         Mag current 2 5 mA   Mag pulse width 250 ?sec   Mag on time 60 sec      Battery: 50-75%     Since his last visit, he did have 2 seizures since his last appointment  He didn't have any shaking and didn't have any vocalization, but fell out of this chair and landed on the ground  He hadn't had any clear provocation, not being sick, missed any medications, etc  These lasted only about 45 seconds  His mother denies any side effects to his current medications  He otherwise has been doing well  Prior Seizure Medications: Carbamazepine (ineffective)    His history was also obtained from his mother, who was present at today's visit  I did review his CBC and CMP which were drawn on 3/23/2022 as detailed in epic  His sodium level was 135  Remainder of these tests were unremarkable    Objective:    Blood pressure 140/82, height 5' 7" (1 702 m), weight (!) 154 kg (339 lb)  Physical Exam    Neurological Exam      ROS:    Review of Systems   Constitutional: Negative  Negative for appetite change and fever  HENT: Negative  Negative for hearing loss, tinnitus, trouble swallowing and voice change  Eyes: Negative  Negative for photophobia and pain  Respiratory: Negative  Negative for shortness of breath  Cardiovascular: Negative  Negative for palpitations  Gastrointestinal: Negative  Negative for nausea and vomiting  Endocrine: Negative  Negative for cold intolerance  Genitourinary: Negative  Negative for dysuria, frequency and urgency  Musculoskeletal: Negative  Negative for myalgias and neck pain  Skin: Negative  Negative for rash  Neurological: Positive for seizures (2 seizures since last visit)  Negative for dizziness, tremors, syncope, facial asymmetry, speech difficulty, weakness, light-headedness, numbness and headaches  Hematological: Negative  Does not bruise/bleed easily  Psychiatric/Behavioral: Negative  Negative for confusion, hallucinations and sleep disturbance     All other systems reviewed and are negative  I personally reviewed the ROS that was entered by the medical assistant    Voice recognition software was used in the generation of this note  There may be unintentional errors including grammatical errors, spelling errors, or pronoun errors

## 2022-09-29 ENCOUNTER — OFFICE VISIT (OUTPATIENT)
Dept: NEUROLOGY | Facility: CLINIC | Age: 32
End: 2022-09-29
Payer: MEDICARE

## 2022-09-29 VITALS
SYSTOLIC BLOOD PRESSURE: 130 MMHG | HEIGHT: 67 IN | WEIGHT: 315 LBS | HEART RATE: 88 BPM | OXYGEN SATURATION: 98 % | BODY MASS INDEX: 49.44 KG/M2 | DIASTOLIC BLOOD PRESSURE: 82 MMHG | RESPIRATION RATE: 18 BRPM

## 2022-09-29 DIAGNOSIS — G40.919 INTRACTABLE EPILEPSY WITHOUT STATUS EPILEPTICUS, UNSPECIFIED EPILEPSY TYPE (HCC): Primary | ICD-10-CM

## 2022-09-29 DIAGNOSIS — Z96.89 S/P PLACEMENT OF VNS (VAGUS NERVE STIMULATION) DEVICE: ICD-10-CM

## 2022-09-29 PROCEDURE — 99214 OFFICE O/P EST MOD 30 MIN: CPT | Performed by: PSYCHIATRY & NEUROLOGY

## 2022-09-29 PROCEDURE — 95970 ALYS NPGT W/O PRGRMG: CPT | Performed by: PSYCHIATRY & NEUROLOGY

## 2022-09-29 RX ORDER — LACOSAMIDE 200 MG/1
TABLET, FILM COATED ORAL
Qty: 120 TABLET | Refills: 5 | Status: SHIPPED | OUTPATIENT
Start: 2022-09-29

## 2022-09-29 NOTE — PROGRESS NOTES
Review of Systems   Constitutional: Negative  Negative for appetite change and fever  HENT: Negative  Negative for hearing loss, tinnitus, trouble swallowing and voice change  Eyes: Negative  Negative for photophobia and pain  Respiratory: Negative  Negative for shortness of breath  Cardiovascular: Negative  Negative for palpitations  Gastrointestinal: Negative  Negative for nausea and vomiting  Endocrine: Negative  Negative for cold intolerance  Genitourinary: Negative  Negative for dysuria, frequency and urgency  Musculoskeletal: Negative  Negative for myalgias and neck pain  Skin: Negative  Negative for rash  Neurological: Positive for seizures  Negative for dizziness, tremors, syncope, facial asymmetry, speech difficulty, weakness, light-headedness, numbness and headaches  Mom reports no seizure since  April 2022   Hematological: Negative  Does not bruise/bleed easily  Psychiatric/Behavioral: Negative  Negative for confusion, hallucinations and sleep disturbance

## 2022-09-29 NOTE — PROGRESS NOTES
Patient ID: Mindi Howard is a 28 y o  male with autism and intractable epilepsy, s/p VNS implantation, who is returning to Neurology office for follow up of his seizures  Assessment/Plan:    Intractable epilepsy without status epilepticus ([address] )  He fortunately has become seizure-free since adding levetiracetam to his regimen  We did discuss the possibility of trying to simplify his medicines by decreasing oxcarbazepine, as previously planned  However because he has been seizure-free it may be best to continue his medicines unchanged to give a little bit longer period of time without seizures  His mother was in agreement  --he will continue his current medicines unchanged, as detailed below  If he continues to be seizure-free, we may be able to reduce his dose of oxcarbazepine in the future, and possibly reduce his dose of Trokendi or Vimpat which are also at high doses  --if he would have additional seizures, levetiracetam has been quite effective, so I would recommend going up on this medication first    S/P placement of VNS (vagus nerve stimulation) device  I did interrogate his VNS today which is functioning well  His battery is adequate  No changes were made today  He will Return in about 6 months (around 3/29/2023)  Subjective:    HPI  Current seizure medications:  1  Oxcarbazepine 900 mg in the morning and 1200 mg at night  2  Vimpat 400 mg twice a day  3  Trokendi 300 mg in the am and 400 mg in the pm  4  Levetiracetam 1000 mg twice a day  Other medications as per Epic  VNS Settings (Interrogated today  Sentiva S/N [id number]  Implanted on 7/26/2021)  Output current 2 25 mA   Signal frequency 20 Hz   Pulse width 250 ?sec   On time 14 sec   Off time 0 3 min       Mag current 2 5 mA   Mag pulse width 250 ?sec   Mag on time 60 sec     Battery: 50-75%     Since his last visit, he started Levetiracetam as planned  He has been tolerating this well and he hasn't had any side effects   He actually has been seizure free! His mother does not note any additional side effects or problems with increasing/starting levetiracetam   Although we had discussed potentially decreasing his dose of oxcarbazepine, they did not make this change to continues to take 900 mg in the morning 1200 mg at night    Prior Seizure Medications: Carbamazepine (ineffective)    His history was also obtained from his mother, who was present at today's visit  Objective:    Blood pressure 130/82, pulse 88, resp  rate 18, height 5' 7" (1 702 m), weight (!) 152 kg (336 lb), SpO2 98 %  Physical Exam    Neurological Exam      ROS:    Review of Systems    I personally reviewed the ROS that was entered by the medical assistant in a separate note  Voice recognition software was used in the generation of this note  There may be unintentional errors including grammatical errors, spelling errors, or pronoun errors

## 2022-09-29 NOTE — ASSESSMENT & PLAN NOTE
I did interrogate his VNS today which is functioning well  His battery is adequate  No changes were made today

## 2022-09-29 NOTE — ASSESSMENT & PLAN NOTE
He fortunately has become seizure-free since adding levetiracetam to his regimen  We did discuss the possibility of trying to simplify his medicines by decreasing oxcarbazepine, as previously planned  However because he has been seizure-free it may be best to continue his medicines unchanged to give a little bit longer period of time without seizures  His mother was in agreement  --he will continue his current medicines unchanged, as detailed below  If he continues to be seizure-free, we may be able to reduce his dose of oxcarbazepine in the future, and possibly reduce his dose of Trokendi or Vimpat which are also at high doses      --if he would have additional seizures, levetiracetam has been quite effective, so I would recommend going up on this medication first

## 2022-11-09 NOTE — ASSESSMENT & PLAN NOTE
As addressed in HPI   Dr Rebecca Lopes explained in detail, the procedure, the risks and benefits of surgery,  expected postoperative course, and answered all questions , also providing contact information if future questions arise, mother wishes to proceed with surgery for replacement of VNS generator left upper chest     Dr Rebecca Lopes obtained verbal and written consent  Mother inquired about generator type for replacement, she discussed this with Dr Lv Nicholas, advised will defer  to [facility] product rep, e-mail sent to  Oscar Allison to address contact mother form product review and Dr Lv Nicholas  Left upper chest surgical incision has a well healed scar       Preoperative Assessments:     · Prior General Anesthesia Reactions ----denies  · Exercise Capacity ---   Mother admits son has exertional dyspnea but no chest pain when climbing 2 flights of stairs or when walking 2 city blocks secondary to morbid obesity (BMI 51 06), and sedentary  Lifestyle  · Nicotine dependence ---- Never smoked  · Personal history of venous thromboembolic disease --denies  · Bleeding Tendency --- denies easily bruising, or spontaneous nose or gum bleeds  · Nickel or metal reaction/allergy--denies , surgical staples in past without allergic response  · History of cancer or other health condition that requires routine MRI imagining denies  VNS is MRI compliant? Surgery Clearance  · PCP/Medical Clearance--- not indicated as per Dr Rebecca Lopes  · Cardiac- Not required    · PAT---pending completion, mother will complete tomorrow  · Imagining requirement--non required for procedure        · In preparation for surgery preoperative written instructions provided , explained in detail to mother copy provided and documented in AVS  Imiquimod Pregnancy And Lactation Text: This medication is Pregnancy Category C. It is unknown if this medication is excreted in breast milk.

## 2023-01-23 ENCOUNTER — TELEPHONE (OUTPATIENT)
Dept: NEUROLOGY | Facility: CLINIC | Age: 33
End: 2023-01-23

## 2023-01-23 NOTE — TELEPHONE ENCOUNTER
Received -Hi, I'm calling for shari howard. His birthday is 1990. He's a patient of Dr saavedra.  [facility]'t [facility] his vimpat And they want him to switch over and use a different medication, lacosamide.  If you could give me a call back and tell me what I'm supposed to do, I would appreciate it. Okay, thank you.  [phone]    See media dated 1/10    Called pt's mom and made her aware that we will have to submit formulary exception and that we can contact her with determination  [phone]-ok to leave detailed message

## 2023-01-24 NOTE — TELEPHONE ENCOUNTER
ID: [id number]  GROUP: [id number]  PCN: [facility]  BIN: [id number]    Key: [id number]    PA submitted with urgent status, awaiting determination.

## 2023-01-26 NOTE — TELEPHONE ENCOUNTER
Called pharmacy re: below and spoke with pharmacy tech, who informed this writer that she could not check the price as it is not due to be filled for 5 days. Will buddy back in 5 days to f/u.

## 2023-03-13 DIAGNOSIS — G40.919 INTRACTABLE EPILEPSY WITHOUT STATUS EPILEPTICUS, UNSPECIFIED EPILEPSY TYPE (HCC): ICD-10-CM

## 2023-03-13 RX ORDER — LEVETIRACETAM 500 MG/1
TABLET ORAL
Qty: 120 TABLET | Refills: 11 | Status: SHIPPED | OUTPATIENT
Start: 2023-03-13

## 2023-04-03 ENCOUNTER — TELEPHONE (OUTPATIENT)
Dept: NEUROLOGY | Facility: CLINIC | Age: 33
End: 2023-04-03

## 2023-04-18 DIAGNOSIS — G40.919 INTRACTABLE EPILEPSY WITHOUT STATUS EPILEPTICUS, UNSPECIFIED EPILEPSY TYPE (HCC): ICD-10-CM

## 2023-04-18 NOTE — TELEPHONE ENCOUNTER
Patient's mother called in refill for patient's Voltoco 20 mg. Preefer medication in sent to [facility],  Please refill if agreeable.  LOV was 9/29/22,  Next appt is 10.18.23  Please refill if agreeable.

## 2023-04-25 ENCOUNTER — TELEPHONE (OUTPATIENT)
Dept: NEUROLOGY | Facility: CLINIC | Age: 33
End: 2023-04-25

## 2023-04-26 NOTE — TELEPHONE ENCOUNTER
Fax received from pharmacy, PA needed for [address]  ID: [id number]  GROUP: [id number]  PCN: Susie Ortega: [id number]    [place]: [id number]    CaseId:[id number];Status:Approved; Review Type:Prior Auth; Coverage Start Date:03/26/2023; Coverage End Date:04/24/2024;

## 2023-04-27 NOTE — TELEPHONE ENCOUNTER
Received -Hi, I'm calling for shari howard  His birthday is 1990  Calling in reference to I need a pre authorization for his valtoco 20 milligrams, his rescue nasal spray for seizures  If you can give me a call back, I would appreciate it  I called last week and I still haven't heard anything and the pharmacy is, is waiting  Okay, thank you  [phone]  ------------------------------------  George Cervantes and made them aware of approval     She has to see if they can order this and will contact pt       Called pt's dad and made him aware of above

## 2023-05-08 ENCOUNTER — TELEPHONE (OUTPATIENT)
Dept: NEUROLOGY | Facility: CLINIC | Age: 33
End: 2023-05-08

## 2023-05-08 NOTE — TELEPHONE ENCOUNTER
Received  transcription:    Hi it's Suha at [facility] [employer]  My phone number is [phone]  Just calling to verify a dose for a prescription for a [employer] [facility]  It's for Vimpat 200 mg  He's getting 2 tablets twice daily  I'm just showing that the max is 400 mg per day and this is 400 mg twice daily  So if somebody could just return my call  It was written by a Dr Wen Mckenna at [facility] double checking a dose for [employer] [facility]  Phone number here, [phone]  Thank you

## 2023-05-08 NOTE — TELEPHONE ENCOUNTER
Call returned to pharmacy  Left voicemail confirming current dose of 400mg BID - dose since 2021  Requested call back if questions

## 2023-05-30 DIAGNOSIS — G40.919 INTRACTABLE EPILEPSY WITHOUT STATUS EPILEPTICUS, UNSPECIFIED EPILEPSY TYPE (HCC): ICD-10-CM

## 2023-05-30 RX ORDER — SERTRALINE HYDROCHLORIDE 100 MG/1
TABLET, FILM COATED ORAL
Qty: 30 TABLET | Refills: 2 | Status: SHIPPED | OUTPATIENT
Start: 2023-05-30

## 2023-06-11 NOTE — PRE-PROCEDURE INSTRUCTIONS
Pre-Surgery Instructions:   Medication Instructions    OXcarbazepine (TRILEPTAL) 600 mg tablet Patient was instructed by Physician and understands     sertraline (ZOLOFT) 100 mg tablet Patient was instructed by Physician and understands   Trokendi  MG CP24 Patient was instructed by Physician and understands   Trokendi  MG CP24 Patient was instructed by Physician and understands   Vimpat 200 MG tablet Patient was instructed by Physician and understands  Jean Carlos

## 2023-06-15 ENCOUNTER — OFFICE VISIT (OUTPATIENT)
Dept: NEUROLOGY | Facility: CLINIC | Age: 33
End: 2023-06-15
Payer: MEDICARE

## 2023-06-15 VITALS
HEIGHT: 67 IN | WEIGHT: 315 LBS | SYSTOLIC BLOOD PRESSURE: 130 MMHG | BODY MASS INDEX: 49.44 KG/M2 | DIASTOLIC BLOOD PRESSURE: 90 MMHG

## 2023-06-15 DIAGNOSIS — Z96.89 S/P PLACEMENT OF VNS (VAGUS NERVE STIMULATION) DEVICE: Primary | ICD-10-CM

## 2023-06-15 DIAGNOSIS — G40.919 INTRACTABLE EPILEPSY WITHOUT STATUS EPILEPTICUS, UNSPECIFIED EPILEPSY TYPE (HCC): ICD-10-CM

## 2023-06-15 PROCEDURE — 99214 OFFICE O/P EST MOD 30 MIN: CPT | Performed by: PSYCHIATRY & NEUROLOGY

## 2023-06-15 PROCEDURE — 95970 ALYS NPGT W/O PRGRMG: CPT | Performed by: PSYCHIATRY & NEUROLOGY

## 2023-06-15 RX ORDER — SERTRALINE HYDROCHLORIDE 100 MG/1
100 TABLET, FILM COATED ORAL DAILY
Qty: 30 TABLET | Refills: 11 | Status: SHIPPED | OUTPATIENT
Start: 2023-06-15

## 2023-06-15 RX ORDER — TOPIRAMATE 200 MG/1
CAPSULE, EXTENDED RELEASE ORAL
Qty: 90 CAPSULE | Refills: 11 | Status: SHIPPED | OUTPATIENT
Start: 2023-06-15

## 2023-06-15 RX ORDER — OXCARBAZEPINE 600 MG/1
TABLET, FILM COATED ORAL
Qty: 315 TABLET | Refills: 3 | Status: SHIPPED | OUTPATIENT
Start: 2023-06-15

## 2023-06-15 RX ORDER — LEVETIRACETAM 500 MG/1
1000 TABLET ORAL EVERY 12 HOURS SCHEDULED
Qty: 360 TABLET | Refills: 3 | Status: SHIPPED | OUTPATIENT
Start: 2023-06-15

## 2023-06-15 RX ORDER — LACOSAMIDE 200 MG/1
TABLET, FILM COATED ORAL
Qty: 120 TABLET | Refills: 5 | Status: SHIPPED | OUTPATIENT
Start: 2023-06-15

## 2023-06-15 RX ORDER — TOPIRAMATE 100 MG/1
CAPSULE, EXTENDED RELEASE ORAL
Qty: 30 CAPSULE | Refills: 11 | Status: SHIPPED | OUTPATIENT
Start: 2023-06-15

## 2023-06-15 NOTE — ASSESSMENT & PLAN NOTE
He has been tolerating his VNS well, without any problems with the stimulations  This has been helpful for him overall, so he would benefit from continued use  I did interrogate his VNS today and his battery did decrease a little to be in the 25-50% range  He does not need replacement of the generator at this point  I would recommend more close monitoring since his battery has decreased to this range over the last 3 years

## 2023-06-15 NOTE — ASSESSMENT & PLAN NOTE
He continues to be seizure free without any side effects  Because he is doing well, I would not recommend making any changes today  -- he will continue his seizure medications unchanged  He had some issues with getting Trokendi due to a supply shortage  I discussed that if this would occur in the future, his mother should call our office to look into options  We may be able to change to immediate release Topiramate if absolutely necessary due to a shortage

## 2023-06-15 NOTE — PATIENT INSTRUCTIONS
-- continue his medications unchanged  -- I would like to see him back in 6 months to recheck his VNS

## 2023-06-15 NOTE — PROGRESS NOTES
Patient ID: Viji Munoz is a 33 y o  male with with autism and epilepsy, s/p VNS implantation, who is returning to Neurology office for follow up of his seizures  Assessment/Plan:    Intractable epilepsy without status epilepticus ([facility] [address] )  He continues to be seizure free without any side effects  Because he is doing well, I would not recommend making any changes today  -- he will continue his seizure medications unchanged  He had some issues with getting Trokendi due to a supply shortage  I discussed that if this would occur in the future, his mother should call our office to look into options  We may be able to change to immediate release Topiramate if absolutely necessary due to a shortage  S/P placement of VNS (vagus nerve stimulation) device  He has been tolerating his VNS well, without any problems with the stimulations  This has been helpful for him overall, so he would benefit from continued use  I did interrogate his VNS today and his battery did decrease a little to be in the 25-50% range  He does not need replacement of the generator at this point  I would recommend more close monitoring since his battery has decreased to this range over the last 3 years  He will Return in about 6 months (around 12/15/2023)  Subjective:    HPI  Current seizure medications:  1  Oxcarbazepine 900 mg in the morning and 1200 mg at night  2  Vimpat 400 mg twice a day  3  Trokendi 300 mg in the am and 400 mg in the pm  4  Levetiracetam 1000 mg twice a day  Other medications as per Epic  VNS Settings (interrogated today  SenTiva   SN [id number]  Date implanted 7/26/2021)  Output current 2 25 mA   Signal frequency 20 Hz   Pulse width 250 ?sec   On time 14 sec   Off time 0 3 min       Mag current 2 5 mA   Mag pulse width 250 ?sec   Mag on time 60 sec     Battery: 25-50%     Since his last visit, he has not had any additional seizures   He has overall been doing well and his mother denies noticing "any problems with side effects from his medications  He continues to tolerate the VNS well also  His mother denies seeing any signs of dyscomfort, including coughing or difficulty swallowing  Prior Seizure Medications: Carbamazepine (ineffective)     His history was also obtained from his mother, who was present at today's visit  Objective:    Blood pressure 130/90, height 5' 7\" (1 702 m), weight (!) 154 kg (340 lb)  Physical Exam    Neurological Exam      ROS:    Review of Systems   Constitutional: Negative  Negative for appetite change and fever  HENT: Negative  Negative for hearing loss, tinnitus, trouble swallowing and voice change  Eyes: Negative  Negative for photophobia, pain and visual disturbance  Respiratory: Negative  Negative for shortness of breath  Cardiovascular: Negative  Negative for palpitations  Gastrointestinal: Negative  Negative for nausea and vomiting  Endocrine: Negative  Negative for cold intolerance  Genitourinary: Negative  Negative for dysuria, frequency and urgency  Musculoskeletal: Negative  Negative for gait problem, myalgias and neck pain  Skin: Negative  Negative for rash  Allergic/Immunologic: Negative  Neurological: Negative  Negative for dizziness, tremors, seizures, syncope, facial asymmetry, speech difficulty, weakness, light-headedness, numbness and headaches  Hematological: Negative  Does not bruise/bleed easily  Psychiatric/Behavioral: Negative  Negative for confusion, hallucinations and sleep disturbance  All other systems reviewed and are negative  I personally reviewed the ROS that was entered by the medical assistant    Voice recognition software was used in the generation of this note  There may be unintentional errors including grammatical errors, spelling errors, or pronoun errors       "

## 2023-07-18 ENCOUNTER — TELEPHONE (OUTPATIENT)
Dept: NEUROLOGY | Facility: CLINIC | Age: 33
End: 2023-07-18

## 2023-07-18 DIAGNOSIS — G40.919 INTRACTABLE EPILEPSY WITHOUT STATUS EPILEPTICUS, UNSPECIFIED EPILEPSY TYPE (HCC): Primary | ICD-10-CM

## 2023-07-18 NOTE — TELEPHONE ENCOUNTER
Pts mother called in to say she is having issues getting the Trokendi medications in both doses. She was told to call in to Dr. Bossman Carlson for an alternative.  Please assist.

## 2023-07-18 NOTE — TELEPHONE ENCOUNTER
Spoke to pharmacy. It appears to be a /marketing restriction. Pharmacy is only able to get 1 bottle every 2 weeks. Unable to tell when they will be able to get next bottle. Call placed to mom. Phone answered by dad. requested I call [phone] and review with mom. Call placed to alternate number for mom, no answer. Message left for return call to office. Options are to try alternate pharmacy or alternate medication.

## 2023-07-19 NOTE — TELEPHONE ENCOUNTER
received vm from 9am-Hi, this is [employer] I'm calling for shari howard. His YOB: 1990. I know you tried calling me back yesterday. This message is for Mimi Halsted. It's regarding his trokendi 200 and 100. There's manufacturing issues. And Dr. Maira Espinal was going to describe something different for him or whatever he recommends Im on board with, you call it in to the [facility] pharmacy in [facility] on [phone] in [address] [place] [address]. My phone number is [phone]. If you could give me a call back, I would appreciate it. Thank you.  ----------------------------------------  See message below.   Mindi Fernandez is currently speaking to pt's mom

## 2023-07-19 NOTE — TELEPHONE ENCOUNTER
Received VM transcription:    Hi, this is Lana Brito. I'm calling back to speak to [place]. It's regarding his Trokendi. Its for Son Lozada, his YOB: 1990. I know you called yesterday and I tried to connect with you and I called you back. I can't get his Trokendi 200 or 100 because the manufacturing delays/issues and Dr Roman Garrison was gonna prescribe something different for me. So if you could just call the pharmacy in [facility] in [facility] and just let me know whatever he suggests I'm on board with it. If you could give me a call back, I would appreciate it. Okay, thank you.  ---------------------------------------------------    Spoke with pt mom Eva Andrade says they are agreeable to alternate medication. Says she has already called around and was told this is a problem with Trokendi in general. Will have issues no matter where she goes. Therefore, best option is to go with a different medication. Dr. Roman Garrison - Please advise on alternative to Trokendi. Best  [phone], ok to leave detailed message. Home phone available if no answer on mobile [phone], ok to leave detailed message.

## 2023-07-20 RX ORDER — TOPIRAMATE 100 MG/1
150 TABLET, FILM COATED ORAL 2 TIMES DAILY
Qty: 90 TABLET | Refills: 11 | Status: SHIPPED | OUTPATIENT
Start: 2023-07-20

## 2023-07-20 NOTE — TELEPHONE ENCOUNTER
Recd vm fm pt's mother [facility] regarding Trokendi (see notes below). _________________________________    Rohith Cardenas w/mother. Advised her of Dr. Silvestre Madrigal recommendation below. Mother is agreeable to plan. Please send script to [facility] in [place]. Dr. Sherwin Mohs - please enter rx. Thank you!

## 2023-07-20 NOTE — TELEPHONE ENCOUNTER
Trokendi is just extended release Topiramate. The easiest thing to do would be to change to generic, immediate release Topiramate. He would have to take it twice a day, but if he would take 150 mg twice a day, it would be the same medication and same overall dose of medication to what he is taking now.

## 2023-07-21 NOTE — TELEPHONE ENCOUNTER
ID: [id number]  GROUP: [id number]  PCNChevis Sit: [id number]    Key: [id number] - error message received: Drug is covered by current benefit plan. No further PA activity needed    Call placed to plan, [phone]. Still claim in system for trokendi being processed on 7/18/2023. If that is reversed out topiramate will go through. Call placed to pharmacy, [phone]. On hold for greater than 20 minutes. Message left for pharmacy informing of previous. Call placed to mom. Informed of previous. Verbalized understanding. She will also follow with pharmacy.

## 2023-07-21 NOTE — TELEPHONE ENCOUNTER
Spoke w/pt's mother David Pinto. She received notice from pharmacy that topiramate 100mg requires a PA. Patient will be out of medication on Monday. Please submit urgent PA.

## 2023-12-13 ENCOUNTER — TELEPHONE (OUTPATIENT)
Dept: NEUROLOGY | Facility: CLINIC | Age: 33
End: 2023-12-13

## 2023-12-13 DIAGNOSIS — G40.919 INTRACTABLE EPILEPSY WITHOUT STATUS EPILEPTICUS, UNSPECIFIED EPILEPSY TYPE (HCC): ICD-10-CM

## 2023-12-13 NOTE — TELEPHONE ENCOUNTER
Patient was scheduled in the [facility] office  when they only are seen in the [facility] office  due to transportation issues      Patient Mother called after they received a text message to confirm the appointment and noticed it was in [facility]       Patient RS for 1/31/2024 at 1 pm [facility]     Apologized for the error

## 2023-12-13 NOTE — TELEPHONE ENCOUNTER
This patient is in need of a medication refill /reorder of the following medications          levETIRAcetam (KEPPRA) 500 mg tablet [[id number]]        OXcarbazepine (TRILEPTAL) 600 mg tablet [[id number]]         :  Vimpat 200 MG tablet [[id number]]     Patient had a appointment 12/15/23 but needed to Rs to 1/31/24    [address] [place] [address] Avenue  [address]  Phone: [phone]  Fax: [phone]  YAYA #: [id number]

## 2023-12-14 NOTE — TELEPHONE ENCOUNTER
Chart review shows refills available on all but Vimpat. Called pharmacy to confirm. Pharmacy confirms all meds requested have refills except Vimpat, however Vimpat was just picked up on 12/7 per pharmacist.    Jerald Gresham pt to inform of this and spoke with his mother. She verbalized an understanding and asked that we send refills for the Vimpat now just so that he has refills available. Please sign off if agreeable. Thank you.

## 2023-12-15 RX ORDER — LACOSAMIDE 200 MG/1
TABLET, FILM COATED ORAL
Qty: 120 TABLET | Refills: 5 | Status: SHIPPED | OUTPATIENT
Start: 2023-12-15

## 2024-01-24 ENCOUNTER — TELEPHONE (OUTPATIENT)
Dept: NEUROLOGY | Facility: CLINIC | Age: 34
End: 2024-01-24

## 2024-01-31 ENCOUNTER — OFFICE VISIT (OUTPATIENT)
Dept: NEUROLOGY | Facility: CLINIC | Age: 34
End: 2024-01-31
Payer: MEDICARE

## 2024-01-31 VITALS
BODY MASS INDEX: 49.44 KG/M2 | TEMPERATURE: 99 F | HEIGHT: 67 IN | DIASTOLIC BLOOD PRESSURE: 82 MMHG | SYSTOLIC BLOOD PRESSURE: 126 MMHG | WEIGHT: 315 LBS

## 2024-01-31 DIAGNOSIS — G40.919 INTRACTABLE EPILEPSY WITHOUT STATUS EPILEPTICUS, UNSPECIFIED EPILEPSY TYPE (HCC): ICD-10-CM

## 2024-01-31 DIAGNOSIS — Z96.89 S/P PLACEMENT OF VNS (VAGUS NERVE STIMULATION) DEVICE: Primary | ICD-10-CM

## 2024-01-31 PROCEDURE — 95970 ALYS NPGT W/O PRGRMG: CPT | Performed by: PSYCHIATRY & NEUROLOGY

## 2024-01-31 PROCEDURE — 99214 OFFICE O/P EST MOD 30 MIN: CPT | Performed by: PSYCHIATRY & NEUROLOGY

## 2024-01-31 NOTE — PROGRESS NOTES
Patient ID: Hamzah Escalona is a 33 y.o. male with autism and epilepsy, s/p VNS implantation , who is returning to Neurology office for follow up of his seizures.       Assessment/Plan:    Intractable epilepsy without status epilepticus (HCC)  He did have 2 seizures since his last appointment, 1 of which was unwitnessed.  Upon interrogating his VNS today, it is at end of battery, but appears to be functioning well, so I do not suspect that this was the cause of his seizures.  He had previously been on higher dose of topiramate, so it may be best for us to increase this a little bit further.  Trokendi was too expensive, but they will continue immediate release topiramate and we will increase the dose.    --He will continue Vimpat, oxcarbazepine, and levetiracetam unchanged.  I will have him increase his topiramate to be 200 mg twice a day.  His topiramate may be able to be increased further if seizures would continue    S/P placement of VNS (vagus nerve stimulation) device  Upon interrogating his VNS, this appears to be at end of battery life.  Discussed the importance of getting this replaced with neurosurgery, ideally within the next month.  I will place a referral to neurosurgery to have his VNS generator replaced.  In the future, we may consider changing him from rapid cycling and using auto stimulation to try to preserve his battery  life        He will Return in about 3 months (around 4/30/2024).      Subjective:    HPI  Current seizure medications:  1. Levetiracetam 1000 mg twice a day  2. Vimpat 400 mg twice a day  3. Topiramate 150 mg twice a day   4. Oxcarbazepine 900 mg in the morning and 1200 mg at night   Other medications as per Epic.    VNS Settings (interrogated today. [employer] . SN [id number]. Date implanted 7/26/2021)  Output current 2.25 mA   Signal frequency 20 Hz   Pulse width 250 ?sec   On time 14 sec   Off time 0.3 min       Mag current 2.25 mA   Mag pulse width 250 ?sec   Mag on time 60 sec  "    Battery: 8-15%     Since his last visit, he had two seizures. With one, he fell out of bed. His mother didn't see what happened, but just heard him fall out of bed. With the other seizure, he was getting onto his bus and he stopped, seemed to freeze and kept sitting in the car. It took him a little bit to get back to his normal self. His mother reported that she seemed slightly disoriented. These were both within a few days after Katherine. He otherwise had been doing well.     In July, shortly after his last visit, they had difficulty getting Trokendi, so he was changed to Topiramate immediate release. He has been doing well since that time until his seizures at the end of December.     Prior Seizure Medications: Carbamazepine (ineffective)     His history was also obtained from his mother, who was present at today's visit.        Objective:    Blood pressure 126/82, temperature 99 °F (37.2 °C), temperature source Temporal, height 5' 7\" (1.702 m), weight (!) 159 kg (350 lb).    Physical Exam    Neurological Exam      ROS:    Review of Systems   Constitutional:  Negative for appetite change, fatigue and fever.   HENT: Negative.  Negative for hearing loss, tinnitus, trouble swallowing and voice change.    Eyes: Negative.  Negative for photophobia, pain and visual disturbance.   Respiratory: Negative.  Negative for shortness of breath.    Cardiovascular: Negative.  Negative for palpitations.   Gastrointestinal: Negative.  Negative for nausea and vomiting.   Endocrine: Negative.  Negative for cold intolerance.   Genitourinary: Negative.  Negative for dysuria, frequency and urgency.   Musculoskeletal:  Negative for back pain, gait problem, myalgias, neck pain and neck stiffness.   Skin: Negative.  Negative for rash.   Allergic/Immunologic: Negative.    Neurological:  Positive for seizures. Negative for dizziness, tremors, syncope, facial asymmetry, speech difficulty, weakness, light-headedness, numbness and " headaches.        Last seizure 12/30   Hematological: Negative.  Does not bruise/bleed easily.   Psychiatric/Behavioral: Negative.  Negative for confusion, hallucinations and sleep disturbance.    All other systems reviewed and are negative.      I personally reviewed the ROS that was entered by the medical assistant    Voice recognition software was used in the generation of this note. There may be unintentional errors including grammatical errors, spelling errors, or pronoun errors.

## 2024-01-31 NOTE — PATIENT INSTRUCTIONS
-- Please increase his Topiramate to be 200 mg twice a day.     -- continue his other medications unchanged.     -- I did refer him to Neurosurgery to get his VNS generator replaced due to the battery being low.

## 2024-02-05 NOTE — ASSESSMENT & PLAN NOTE
Upon interrogating his VNS, this appears to be at end of battery life.  Discussed the importance of getting this replaced with neurosurgery, ideally within the next month.  I will place a referral to neurosurgery to have his VNS generator replaced.  In the future, we may consider changing him from rapid cycling and using auto stimulation to try to preserve his battery  life

## 2024-02-05 NOTE — ASSESSMENT & PLAN NOTE
He did have 2 seizures since his last appointment, 1 of which was unwitnessed.  Upon interrogating his VNS today, it is at end of battery, but appears to be functioning well, so I do not suspect that this was the cause of his seizures.  He had previously been on higher dose of topiramate, so it may be best for us to increase this a little bit further.  Jessieandres was too expensive, but they will continue immediate release topiramate and we will increase the dose.    --He will continue Vimpat, oxcarbazepine, and levetiracetam unchanged.  I will have him increase his topiramate to be 200 mg twice a day.  His topiramate may be able to be increased further if seizures would continue

## 2024-02-08 ENCOUNTER — OFFICE VISIT (OUTPATIENT)
Dept: NEUROSURGERY | Facility: CLINIC | Age: 34
End: 2024-02-08
Payer: MEDICARE

## 2024-02-08 VITALS
OXYGEN SATURATION: 92 % | TEMPERATURE: 97.3 F | WEIGHT: 315 LBS | HEART RATE: 77 BPM | DIASTOLIC BLOOD PRESSURE: 70 MMHG | HEIGHT: 67 IN | RESPIRATION RATE: 18 BRPM | SYSTOLIC BLOOD PRESSURE: 126 MMHG | BODY MASS INDEX: 49.44 KG/M2

## 2024-02-08 DIAGNOSIS — G40.919 INTRACTABLE EPILEPSY WITHOUT STATUS EPILEPTICUS, UNSPECIFIED EPILEPSY TYPE (HCC): ICD-10-CM

## 2024-02-08 PROCEDURE — 99213 OFFICE O/P EST LOW 20 MIN: CPT | Performed by: STUDENT IN AN ORGANIZED HEALTH CARE EDUCATION/TRAINING PROGRAM

## 2024-02-08 NOTE — PROGRESS NOTES
Office Note - Neurosurgery   Hamzah Escalona 33 y.o. male MRN: [id number]      Assessment/Plan:    Hamzah Escalona is a 33 year old male with a history of intractable epilepsy and prior VNS implantation with battery at end of life. Review of Dr. Beasley's most recent clinic note shows that battery life at 8-15% with stimulation parameters listed below. He tolerated his most recent battery change with Dr. Petty in 2021 well without issues. His prior left sided scar has healed well. I discussed with his mother that this would be an outpatient procedure done under sedation. The incision will be closed with absorbable sutures. We discussed risks including bleeding, infection, as well as device malfunction. She signed consent today as his medical decision maker and we will work to schedule a surgical date.    Current Stimulation Parameters:  VNS Settings (interrogated today. [employer] . SN [id number]. Date implanted 7/26/2021)  Output current 2.25 mA   Signal frequency 20 Hz   Pulse width 250 ?sec   On time 14 sec   Off time 0.3 min         Mag current 2.25 mA   Mag pulse width 250 ?sec   Mag on time 60 sec      Battery: 8-15%       Subjective/Objective     Chief Complaint    Follow-up       HPI    Hamzah Escalona is a 33 year old male with a history of intractable epilepsy and prior VNS implantation with battery at end of life.    ROS  ROS personally reviewed and updated.    Review of Systems   Unable to perform ROS: Patient nonverbal   Constitutional: Negative.    HENT: Negative.     Eyes: Negative.    Respiratory: Negative.     Genitourinary: Negative.    Skin:  Positive for wound.   Neurological:  Positive for seizures.        Seizure's started at age 14.   Psychiatric/Behavioral:          Autism       Family History    Family History   Problem Relation Age of Onset    No Known Problems Mother     Arthritis Father     COPD Maternal Grandmother     Heart disease Maternal Grandfather     Arthritis Paternal Grandmother     Heart  disease Paternal Grandfather        Social History    Social History     Socioeconomic History    Marital status: Single     Spouse name: Not on file    Number of children: Not on file    Years of education: Not on file    Highest education level: Not on file   Occupational History    Not on file   Tobacco Use    Smoking status: Never    Smokeless tobacco: Never   Vaping Use    Vaping status: Never Used   Substance and Sexual Activity    Alcohol use: Never    Drug use: Never    Sexual activity: Not Currently   Other Topics Concern    Not on file   Social History Narrative    Not on file     Social Determinants of Health     Financial Resource Strain: Not on file   Food Insecurity: Not on file   Transportation Needs: Not on file   Physical Activity: Not on file   Stress: Not on file   Social Connections: Not on file   Intimate Partner Violence: Not on file   Housing Stability: Not on file       Past Medical History    Past Medical History:   Diagnosis Date    Autism     Seizures (HCC)        Surgical History    Past Surgical History:   Procedure Laterality Date    EXTERNAL EAR SURGERY      x2 for boxer's ear    HYDROCELE EXCISION / REPAIR      TX INSJ/RPLCMT CRANIAL NEUROSTIM PULSE GENERATOR Left 7/26/2021    Procedure: REOPENING OF LEFT CHEST INCISION FOR REPLACEMENT OF IMPLANTABLE PULSE GENERATOR VAGUS NERVE STIMULATOR;  Surgeon: Nikko Petty MD;  Location: [facility];  Service: Neurosurgery    VAGAL NERVE STIMULATOR (VNS) PLACEMENT         Medications      Current Outpatient Medications:     diazePAM, 20 MG Dose, (Valtoco 20 MG Dose) 2 x 10 MG/0.1ML LQPK, Give one 10 mg spray device in each nostril for total dose of 20 mg as needed for seizures lasting longer than 5 min., Disp: 4 each, Rfl: 1    levETIRAcetam (KEPPRA) 500 mg tablet, Take 2 tablets (1,000 mg total) by mouth every 12 (twelve) hours, Disp: 360 tablet, Rfl: 3    OXcarbazepine (TRILEPTAL) 600 mg tablet, Give 1.5 tabs (900 mg) in the morning and 2 tabs  "(1200 mg) at night., Disp: 315 tablet, Rfl: 3    sertraline (ZOLOFT) 100 mg tablet, Take 1 tablet (100 mg total) by mouth daily, Disp: 30 tablet, Rfl: 11    topiramate (Topamax) 200 MG tablet, Take 1 tablet (200 mg total) by mouth 2 (two) times a day, Disp: 180 tablet, Rfl: 3    Vimpat 200 MG tablet, TAKE TWO TABLETS (400mg) BY MOUTH TWICE DAILY, Disp: 120 tablet, Rfl: 5    Allergies    Allergies   Allergen Reactions    Amoxicillin Hives       Physical Exam    Vitals:  Blood pressure 126/70, pulse 77, temperature (!) 97.3 °F (36.3 °C), resp. rate 18, height 5' 7\" (1.702 m), weight (!) 159 kg (350 lb), SpO2 92%.,Body mass index is 54.82 kg/m².    Physical Exam  Neurologic Exam   Awake and alert  Grossly 5/5 throughout  Prior left sided chest/axilla incision well healed    "

## 2024-02-13 ENCOUNTER — ANESTHESIA EVENT (OUTPATIENT)
Dept: PERIOP | Facility: HOSPITAL | Age: 34
End: 2024-02-13
Payer: MEDICARE

## 2024-02-13 NOTE — PRE-PROCEDURE INSTRUCTIONS
Pre-Surgery Instructions:   Medication Instructions    diazePAM, 20 MG Dose, (Valtoco 20 MG Dose) 2 x 10 MG/0.1ML LQPK Uses PRN- OK to take day of surgery    levETIRAcetam (KEPPRA) 500 mg tablet Take day of surgery.    OXcarbazepine (TRILEPTAL) 600 mg tablet Take day of surgery.    sertraline (ZOLOFT) 100 mg tablet Take day of surgery.    topiramate (Topamax) 200 MG tablet Take day of surgery.    Vimpat 200 MG tablet Take day of surgery.    Medication instructions for day surgery reviewed. Please use only a sip of water to take your instructed medications. Avoid all over the counter vitamins, supplements and NSAIDS for one week prior to surgery per anesthesia guidelines. Tylenol is ok to take as needed.     You will receive a call one business day prior to surgery with an arrival time and hospital directions. If your surgery is scheduled on a Monday, the hospital will be calling you on the Friday prior to your surgery. If you have not heard from anyone by 8pm, please call the hospital supervisor through the hospital  at [phone]. ([place] [phone] or [place] [phone]).    Do not eat or drink anything after midnight the night before your surgery, including candy, mints, lifesavers, or chewing gum. Do not drink alcohol 24hrs before your surgery. Try not to smoke at least 24hrs before your surgery.       Follow the pre surgery showering instructions as listed in the “My Surgical Experience Booklet” or otherwise provided by your surgeon's office. Do not use a blade to shave the surgical area 1 week before surgery. It is okay to use a clean electric clippers up to 24 hours before surgery. Do not apply any lotions, creams, including makeup, cologne, deodorant, or perfumes after showering on the day of your surgery. Do not use dry shampoo, hair spray, hair gel, or any type of hair products.     No contact lenses, eye make-up, or artificial eyelashes. Remove nail polish, including gel polish, and any  artificial, gel, or acrylic nails if possible. Remove all jewelry including rings and body piercing jewelry.     Wear causal clothing that is easy to take on and off. Consider your type of surgery.    Keep any valuables, jewelry, piercings at home. Please bring any specially ordered equipment (sling, braces) if indicated.    Arrange for a responsible person to drive you to and from the hospital on the day of your surgery. Visitor Guidelines discussed.     Call the surgeon's office with any new illnesses, exposures, or additional questions prior to surgery.    Please reference your “My Surgical Experience Booklet” for additional information to prepare for your upcoming surgery.

## 2024-02-19 ENCOUNTER — DOCUMENTATION (OUTPATIENT)
Dept: NEUROSURGERY | Facility: CLINIC | Age: 34
End: 2024-02-19

## 2024-02-20 ENCOUNTER — ANESTHESIA (OUTPATIENT)
Dept: PERIOP | Facility: HOSPITAL | Age: 34
End: 2024-02-20
Payer: MEDICARE

## 2024-02-20 ENCOUNTER — HOSPITAL ENCOUNTER (OUTPATIENT)
Facility: HOSPITAL | Age: 34
Setting detail: OUTPATIENT SURGERY
Discharge: HOME | End: 2024-02-20
Attending: STUDENT IN AN ORGANIZED HEALTH CARE EDUCATION/TRAINING PROGRAM | Admitting: STUDENT IN AN ORGANIZED HEALTH CARE EDUCATION/TRAINING PROGRAM
Payer: MEDICARE

## 2024-02-20 VITALS
SYSTOLIC BLOOD PRESSURE: 150 MMHG | HEART RATE: 75 BPM | TEMPERATURE: 98.8 F | WEIGHT: 315 LBS | RESPIRATION RATE: 20 BRPM | HEIGHT: 67 IN | BODY MASS INDEX: 49.44 KG/M2 | OXYGEN SATURATION: 96 % | DIASTOLIC BLOOD PRESSURE: 85 MMHG

## 2024-02-20 DIAGNOSIS — Z98.890 POSTOPERATIVE STATE: ICD-10-CM

## 2024-02-20 DIAGNOSIS — Z96.89 S/P PLACEMENT OF VNS (VAGUS NERVE STIMULATION) DEVICE: Primary | ICD-10-CM

## 2024-02-20 PROCEDURE — 61885 INSRT/REDO NEUROSTIM 1 ARRAY: CPT | Performed by: PHYSICIAN ASSISTANT

## 2024-02-20 PROCEDURE — C1767 GENERATOR, NEURO NON-RECHARG: HCPCS | Performed by: STUDENT IN AN ORGANIZED HEALTH CARE EDUCATION/TRAINING PROGRAM

## 2024-02-20 PROCEDURE — NC001 PR NO CHARGE: Performed by: STUDENT IN AN ORGANIZED HEALTH CARE EDUCATION/TRAINING PROGRAM

## 2024-02-20 PROCEDURE — 61885 INSRT/REDO NEUROSTIM 1 ARRAY: CPT | Performed by: STUDENT IN AN ORGANIZED HEALTH CARE EDUCATION/TRAINING PROGRAM

## 2024-02-20 RX ORDER — LIDOCAINE HYDROCHLORIDE AND EPINEPHRINE 10; 10 MG/ML; UG/ML
INJECTION, SOLUTION INFILTRATION; PERINEURAL AS NEEDED
Status: DISCONTINUED | OUTPATIENT
Start: 2024-02-20 | End: 2024-02-20 | Stop reason: HOSPADM

## 2024-02-20 RX ORDER — SODIUM CHLORIDE, SODIUM LACTATE, POTASSIUM CHLORIDE, CALCIUM CHLORIDE 600; 310; 30; 20 MG/100ML; MG/100ML; MG/100ML; MG/100ML
INJECTION, SOLUTION INTRAVENOUS CONTINUOUS PRN
Status: DISCONTINUED | OUTPATIENT
Start: 2024-02-20 | End: 2024-02-20

## 2024-02-20 RX ORDER — ONDANSETRON 2 MG/ML
INJECTION INTRAMUSCULAR; INTRAVENOUS AS NEEDED
Status: DISCONTINUED | OUTPATIENT
Start: 2024-02-20 | End: 2024-02-20

## 2024-02-20 RX ORDER — HYDROMORPHONE HCL IN WATER/PF 6 MG/30 ML
0.2 PATIENT CONTROLLED ANALGESIA SYRINGE INTRAVENOUS
Status: DISCONTINUED | OUTPATIENT
Start: 2024-02-20 | End: 2024-02-20 | Stop reason: HOSPADM

## 2024-02-20 RX ORDER — LIDOCAINE HYDROCHLORIDE 10 MG/ML
INJECTION, SOLUTION EPIDURAL; INFILTRATION; INTRACAUDAL; PERINEURAL AS NEEDED
Status: DISCONTINUED | OUTPATIENT
Start: 2024-02-20 | End: 2024-02-20

## 2024-02-20 RX ORDER — ONDANSETRON 4 MG/1
4 TABLET, ORALLY DISINTEGRATING ORAL ONCE
Status: COMPLETED | OUTPATIENT
Start: 2024-02-20 | End: 2024-02-20

## 2024-02-20 RX ORDER — OXYCODONE HYDROCHLORIDE 5 MG/1
5 TABLET ORAL EVERY 6 HOURS PRN
Qty: 5 TABLET | Refills: 0 | Status: SHIPPED | OUTPATIENT
Start: 2024-02-20 | End: 2024-03-01

## 2024-02-20 RX ORDER — SODIUM CHLORIDE, SODIUM LACTATE, POTASSIUM CHLORIDE, CALCIUM CHLORIDE 600; 310; 30; 20 MG/100ML; MG/100ML; MG/100ML; MG/100ML
125 INJECTION, SOLUTION INTRAVENOUS CONTINUOUS
Status: DISCONTINUED | OUTPATIENT
Start: 2024-02-20 | End: 2024-02-20 | Stop reason: HOSPADM

## 2024-02-20 RX ORDER — PROPOFOL 10 MG/ML
INJECTION, EMULSION INTRAVENOUS CONTINUOUS PRN
Status: DISCONTINUED | OUTPATIENT
Start: 2024-02-20 | End: 2024-02-20

## 2024-02-20 RX ORDER — ACETAMINOPHEN 325 MG/1
975 TABLET ORAL EVERY 8 HOURS PRN
Status: DISCONTINUED | OUTPATIENT
Start: 2024-02-20 | End: 2024-02-20 | Stop reason: HOSPADM

## 2024-02-20 RX ORDER — MIDAZOLAM HYDROCHLORIDE 2 MG/2ML
INJECTION, SOLUTION INTRAMUSCULAR; INTRAVENOUS AS NEEDED
Status: DISCONTINUED | OUTPATIENT
Start: 2024-02-20 | End: 2024-02-20

## 2024-02-20 RX ORDER — OXYCODONE HYDROCHLORIDE 5 MG/1
5 TABLET ORAL EVERY 6 HOURS PRN
Status: DISCONTINUED | OUTPATIENT
Start: 2024-02-20 | End: 2024-02-20 | Stop reason: HOSPADM

## 2024-02-20 RX ORDER — DEXAMETHASONE SODIUM PHOSPHATE 10 MG/ML
INJECTION, SOLUTION INTRAMUSCULAR; INTRAVENOUS AS NEEDED
Status: DISCONTINUED | OUTPATIENT
Start: 2024-02-20 | End: 2024-02-20

## 2024-02-20 RX ORDER — ACETAMINOPHEN 325 MG/1
975 TABLET ORAL EVERY 8 HOURS PRN
Start: 2024-02-20

## 2024-02-20 RX ORDER — FENTANYL CITRATE/PF 50 MCG/ML
25 SYRINGE (ML) INJECTION
Status: DISCONTINUED | OUTPATIENT
Start: 2024-02-20 | End: 2024-02-20 | Stop reason: HOSPADM

## 2024-02-20 RX ORDER — CLINDAMYCIN HYDROCHLORIDE 300 MG/1
300 CAPSULE ORAL 4 TIMES DAILY
Qty: 20 CAPSULE | Refills: 0 | Status: SHIPPED | OUTPATIENT
Start: 2024-02-20 | End: 2024-02-25

## 2024-02-20 RX ORDER — LIDOCAINE HYDROCHLORIDE 10 MG/ML
0.5 INJECTION, SOLUTION EPIDURAL; INFILTRATION; INTRACAUDAL; PERINEURAL ONCE AS NEEDED
Status: DISCONTINUED | OUTPATIENT
Start: 2024-02-20 | End: 2024-02-20 | Stop reason: HOSPADM

## 2024-02-20 RX ORDER — PROPOFOL 10 MG/ML
INJECTION, EMULSION INTRAVENOUS AS NEEDED
Status: DISCONTINUED | OUTPATIENT
Start: 2024-02-20 | End: 2024-02-20

## 2024-02-20 RX ORDER — CLINDAMYCIN PHOSPHATE 900 MG/50ML
900 INJECTION, SOLUTION INTRAVENOUS ONCE
Status: CANCELLED | OUTPATIENT
Start: 2024-02-20

## 2024-02-20 RX ORDER — FENTANYL CITRATE 50 UG/ML
INJECTION, SOLUTION INTRAMUSCULAR; INTRAVENOUS AS NEEDED
Status: DISCONTINUED | OUTPATIENT
Start: 2024-02-20 | End: 2024-02-20

## 2024-02-20 RX ORDER — VANCOMYCIN HYDROCHLORIDE 1 G/20ML
INJECTION, POWDER, LYOPHILIZED, FOR SOLUTION INTRAVENOUS AS NEEDED
Status: DISCONTINUED | OUTPATIENT
Start: 2024-02-20 | End: 2024-02-20 | Stop reason: HOSPADM

## 2024-02-20 RX ORDER — CLINDAMYCIN PHOSPHATE 900 MG/50ML
INJECTION, SOLUTION INTRAVENOUS AS NEEDED
Status: DISCONTINUED | OUTPATIENT
Start: 2024-02-20 | End: 2024-02-20

## 2024-02-20 RX ADMIN — ONDANSETRON 4 MG: 4 TABLET, ORALLY DISINTEGRATING ORAL at 15:12

## 2024-02-20 RX ADMIN — FENTANYL CITRATE 50 MCG: 50 INJECTION INTRAMUSCULAR; INTRAVENOUS at 12:26

## 2024-02-20 RX ADMIN — ONDANSETRON 4 MG: 2 INJECTION INTRAMUSCULAR; INTRAVENOUS at 12:31

## 2024-02-20 RX ADMIN — ACETAMINOPHEN 975 MG: 325 TABLET, FILM COATED ORAL at 14:30

## 2024-02-20 RX ADMIN — CLINDAMYCIN PHOSPHATE 900 MG: 900 INJECTION, SOLUTION INTRAVENOUS at 12:30

## 2024-02-20 RX ADMIN — DEXAMETHASONE SODIUM PHOSPHATE 4 MG: 10 INJECTION, SOLUTION INTRAMUSCULAR; INTRAVENOUS at 12:31

## 2024-02-20 RX ADMIN — FENTANYL CITRATE 25 MCG: 50 INJECTION INTRAMUSCULAR; INTRAVENOUS at 12:57

## 2024-02-20 RX ADMIN — LIDOCAINE HYDROCHLORIDE 50 MG: 10 INJECTION, SOLUTION EPIDURAL; INFILTRATION; INTRACAUDAL; PERINEURAL at 12:26

## 2024-02-20 RX ADMIN — MIDAZOLAM 2 MG: 1 INJECTION INTRAMUSCULAR; INTRAVENOUS at 12:21

## 2024-02-20 RX ADMIN — SODIUM CHLORIDE, SODIUM LACTATE, POTASSIUM CHLORIDE, AND CALCIUM CHLORIDE: .6; .31; .03; .02 INJECTION, SOLUTION INTRAVENOUS at 12:18

## 2024-02-20 RX ADMIN — FENTANYL CITRATE 25 MCG: 50 INJECTION INTRAMUSCULAR; INTRAVENOUS at 12:38

## 2024-02-20 RX ADMIN — PROPOFOL 20 MCG/KG/MIN: 10 INJECTION, EMULSION INTRAVENOUS at 12:42

## 2024-02-20 RX ADMIN — PROPOFOL 200 MG: 10 INJECTION, EMULSION INTRAVENOUS at 12:26

## 2024-02-20 NOTE — ANESTHESIA PREPROCEDURE EVALUATION
Procedure:  Replacement of left sided chest VNS battery (Left: Chest)    Relevant Problems   NEURO/PSYCH   (+) Intractable epilepsy without status epilepticus (HCC)        Physical Exam    Airway    Mallampati score: II  TM Distance: >3 FB  Neck ROM: full     Dental   No notable dental hx     Cardiovascular  Cardiovascular exam normal    Pulmonary  Pulmonary exam normal     Other Findings        Anesthesia Plan  ASA Score- 3     Anesthesia Type- general with ASA Monitors.         Additional Monitors:     Airway Plan: LMA.    Comment: No issues with prev anesthetics  No current respiratory/illness concerns  Consent from mother- POA.       Plan Factors-    Chart reviewed.   Existing labs reviewed. Patient summary reviewed.    Patient is not a current smoker.              Induction- intravenous.    Postoperative Plan-     Informed Consent- Anesthetic plan and risks discussed with mother and healthcare power of .  I personally reviewed this patient with the CRNA. Discussed and agreed on the Anesthesia Plan with the CRNA..

## 2024-02-20 NOTE — OP NOTE
OPERATIVE REPORT  PATIENT NAME: Hamzah Escalona    :  1990  MRN: [id number]  Pt Location:  OR ROOM 09    SURGERY DATE: 2024    Surgeons and Role:     * Maxim Orantes MD - Primary  *Rissa CORRAL - Assisting    Preop Diagnosis:  Intractable epilepsy without status epilepticus, unspecified epilepsy type (HCC) [G40.919]    Post-Op Diagnosis Codes:     * Intractable epilepsy without status epilepticus, unspecified epilepsy type (HCC) [G40.919]    Procedure(s):  Left - Replacement of left sided chest VNS battery    Specimen(s):  * No specimens in log *    Estimated Blood Loss:   Minimal    Drains:  * No LDAs found *    Anesthesia Type:   Conscious Sedation     Operative Indications:  Intractable epilepsy without status epilepticus, unspecified epilepsy type (HCC) [G40.919]    Operative Findings:  Prior battery removed, this had migrated deep to greater than the recommended 1 inch. A new superficial pocket was dissected and the old scar pocket closed off with suture. Device interrogated and found to be functioning appropriately in revised pocket.    Complications:   None    Procedure and Technique:  The patient was brought to the OR and remained supine on the stretcher. The prior left axillary incision was prepped and draped in standard fashion. The incision was infiltrated with local anesthetic. The scar was opened sharply and this was carried down to the old IPG with monopolar cautery. It was noted that the IPG had migrated deep to much greater than the recommended 1 inch below the skin. The prior battery was removed from the scar pocket. A new more superficial battery pocket was made after closing down the old scar pocket with a 2-0 vicryl. The battery was removed from the lead with a hex wrench. The monopolar was taken off the field. The new battery was brought onto the field and attached to the lead and secured with a hex wrench. The battery was interrogated once it was in the new pocket site  and found to be functioning appropriately. The battery was secured down with a silk suture. The site was copiously irrigated. Vancomycin powder was placed in the wound. The scar pocket and deep dermal layers were closed with interrupted 2-0 vicryls. The skin was closed with a running 5-0 chromic gut and skin glue. The drapes were taken down. A dressing was placed over the wound. The patient was taken back to PACU in stable condition.    I was present for the entire procedure.    No qualified resident was available. Bari CORRAL was present for the entire procedure, and provided essential assistance with with proper exposure, retraction, hemostasis, and wound closure.     Patient Disposition:  PACU         SIGNATURE: Maxim Orantes MD  DATE: February 20, 2024  TIME: 11:58 AM

## 2024-02-20 NOTE — DISCHARGE INSTR - AVS FIRST PAGE
Discharge Instructions  Battery (IPG) replacement    Activity:  Do not lift more than 10 pounds for 6 weeks.  Avoid bending, lifting and twisting for 6 weeks.  No strenuous activities. No driving for 2 weeks.   When able to shower, continue to use clean towel and washcloth for 2 weeks post-op.  Continue to change bed linens and pajamas more frequently. Wear clean clothes daily.     Surgical incision care:  Keep dressing in place for 3 days.  Keep incision dry for 3 days.  May shower with mild antimicrobial soap after 3 days.   After 3 days, incisions may be left open to air, but must remain clean.  Do not immerse the incisions in water for 6 weeks.  Do not apply any creams or ointments to the incision for 6 weeks, unless otherwise instructed by [facility].  Contact office if increasing redness, drainage, pain or swelling around the incisions.    Postoperative medication:  Complete course of antibiotic as directed.  [facility] will provide pain medication as coordinated with your pain specialist. All prescriptions must come from a single provider.   Take all medications as prescribed.  Call office with any questions/concerns.  Please contact office for questions regarding dosage and modifications.  No antiplatelet, anticoagulation or Nonsteroidal anti-inflammatory (NSAIDs) medication until cleared by [facility], unless otherwise instructed.   Do not operate heavy machinery or vehicles while taking sedating medications.  Use a bowel regimen while on opioids as they induce constipation. Ie. Senokot-S, Miralax, Colace, etc. Increase fiber and water intake.

## 2024-02-20 NOTE — ANESTHESIA POSTPROCEDURE EVALUATION
Post-Op Assessment Note    CV Status:  Stable    Pain management: adequate       Mental Status:  Alert and awake   Hydration Status:  Euvolemic   PONV Controlled:  Controlled   Airway Patency:  Patent     Post Op Vitals Reviewed: Yes    No anethesia notable event occurred.    Staff: Anesthesiologist, CRNA               BP   142/85   Temp      Pulse  85   Resp   22   SpO2   98

## 2024-02-20 NOTE — H&P
Neurosurgery History and Physical    Prior clinic encounter from 2/8/24 reviewed. After examining the patient I find no changes in the patients condition since the encounter.    Patient personally seen and examined.  Neurological examination unchanged compared to last office/progress note, with the following exceptions:    /91 (BP Location: Left arm)   Pulse 96   Temp 98.3 °F (36.8 °C) (Temporal)   Resp 18   SpO2 95%      Awake and alert.  Regular cardiac rate and rhythm.  No respiratory distress.  Abdomen nontender.  Normocephalic.    Post operative instructions and medications have been reviewed with the patient and family.    Assessment and Plan:    All questions have been answered to the patient and family satisfaction.  Plan to proceed with replacement of left sided VNS battery. They are in agreement with proceeding.

## 2024-02-21 ENCOUNTER — TELEPHONE (OUTPATIENT)
Dept: NEUROSURGERY | Facility: CLINIC | Age: 34
End: 2024-02-21

## 2024-02-21 NOTE — TELEPHONE ENCOUNTER
Called patient to see how he is doing after surgery. Patient reports he is doing well overall and denies any incisional issues or fevers. Patient is able to ambulate around the house and complete ADLs. Educated the patient about the importance of preventing blood clots and provided measures how to prevent them.     Patient has not moved his bowels since the surgery. Encouraged patient to take an over the counter stool softener, if he is taking narcotic pain medication. Encouraged fiber intake and fluids.    Reviewed incision care with the patient. Advised that after three days he may take a shower and gently wash the surgical site with soap and water. Use clean wash cloth, towels, and clothing. Do not submerge in water until cleared by the surgeon. Do not apply any creams, ointments, or lotions to the site.  Patient is aware to call the office if any redness, swelling, drainage, dehiscence of incision, or fever >100 F occurs.    Patient is aware to call the office if any concerns or questions may arise. Reminded patient of his upcoming appointments with the date/time/location. Patient was appreciative for the call.

## 2024-03-06 ENCOUNTER — OFFICE VISIT (OUTPATIENT)
Dept: NEUROSURGERY | Facility: CLINIC | Age: 34
End: 2024-03-06

## 2024-03-06 VITALS
BODY MASS INDEX: 49.44 KG/M2 | HEART RATE: 68 BPM | TEMPERATURE: 97.8 F | SYSTOLIC BLOOD PRESSURE: 136 MMHG | OXYGEN SATURATION: 99 % | WEIGHT: 315 LBS | HEIGHT: 67 IN | DIASTOLIC BLOOD PRESSURE: 78 MMHG

## 2024-03-06 DIAGNOSIS — G40.919 INTRACTABLE EPILEPSY WITHOUT STATUS EPILEPTICUS, UNSPECIFIED EPILEPSY TYPE (HCC): Primary | ICD-10-CM

## 2024-03-06 PROCEDURE — 99024 POSTOP FOLLOW-UP VISIT: CPT | Performed by: STUDENT IN AN ORGANIZED HEALTH CARE EDUCATION/TRAINING PROGRAM

## 2024-03-06 NOTE — PROGRESS NOTES
Office Note - Neurosurgery   Hamzah Escalona 33 y.o. male MRN: [id number]      Assessment/Plan:    Hamzah Escalona is a 33 year old male with a history of intractable epilepsy who returns for follow up wound check after recent VNS battery replacement (2/20/24). He is accompanied by his mother who denies any issues with his recovery from surgery. His wound appears to be healing well with some minor residual scabbing and surgical glue. This will fall away with time. Going forward I'll be available should any concerns arise with his VNS system.     Subjective/Objective     Chief Complaint    Post-op (2 WK POV VNS REPLACEMENT)       HPI    Hamzah Escalona is a 33 year old male with a history of intractable epilepsy who returns for follow up wound check after recent VNS battery replacement (2/20/24).     ROS  ROS personally reviewed and updated.    Review of Systems   Unable to perform ROS: Patient nonverbal   Constitutional: Negative.    HENT: Negative.     Eyes: Negative.    Respiratory: Negative.     Cardiovascular: Negative.    Gastrointestinal: Negative.    Endocrine: Negative.    Genitourinary: Negative.    Musculoskeletal: Negative.    Skin:  Positive for wound.   Allergic/Immunologic: Negative.    Neurological:  Positive for seizures.        Seizure's started at age 14.   Hematological: Negative.    Psychiatric/Behavioral: Negative.          Autism       Family History    Family History   Problem Relation Age of Onset    No Known Problems Mother     Arthritis Father     COPD Maternal Grandmother     Heart disease Maternal Grandfather     Arthritis Paternal Grandmother     Heart disease Paternal Grandfather        Social History    Social History     Socioeconomic History    Marital status: Single     Spouse name: Not on file    Number of children: Not on file    Years of education: Not on file    Highest education level: Not on file   Occupational History    Not on file   Tobacco Use    Smoking status: Never    Smokeless  tobacco: Never   Vaping Use    Vaping status: Never Used   Substance and Sexual Activity    Alcohol use: Never    Drug use: Never    Sexual activity: Not Currently   Other Topics Concern    Not on file   Social History Narrative    Not on file     Social Determinants of Health     Financial Resource Strain: Not on file   Food Insecurity: Not on file   Transportation Needs: Not on file   Physical Activity: Not on file   Stress: Not on file   Social Connections: Not on file   Intimate Partner Violence: Not on file   Housing Stability: Not on file       Past Medical History    Past Medical History:   Diagnosis Date    Autism     nonverbal    Seizures (HCC)        Surgical History    Past Surgical History:   Procedure Laterality Date    EXTERNAL EAR SURGERY      x2 for boxer's ear    HYDROCELE EXCISION / REPAIR      [facility] INSJ/RPLCMT CRANIAL NEUROSTIM PULSE GENERATOR Left 7/26/2021    Procedure: REOPENING OF LEFT CHEST INCISION FOR REPLACEMENT OF IMPLANTABLE PULSE GENERATOR VAGUS NERVE STIMULATOR;  Surgeon: Nikko Petty MD;  [facility];  Service: Neurosurgery    VA INSJ/RPLCMT CRANIAL NEUROSTIM PULSE GENERATOR Left 2/20/2024    Procedure: Replacement of left sided chest VNS battery;  Surgeon: Maxim Orantes MD;  [facility];  Service: Neurosurgery    VAGAL NERVE STIMULATOR (VNS) PLACEMENT         Medications      Current Outpatient Medications:     diazePAM, 20 MG Dose, (Valtoco 20 MG Dose) 2 x 10 MG/0.1ML LQPK, Give one 10 mg spray device in each nostril for total dose of 20 mg as needed for seizures lasting longer than 5 min., Disp: 4 each, Rfl: 1    levETIRAcetam (KEPPRA) 500 mg tablet, Take 2 tablets (1,000 mg total) by mouth every 12 (twelve) hours, Disp: 360 tablet, Rfl: 3    OXcarbazepine (TRILEPTAL) 600 mg tablet, Give 1.5 tabs (900 mg) in the morning and 2 tabs (1200 mg) at night., Disp: 315 tablet, Rfl: 3    sertraline (ZOLOFT) 100 mg tablet, Take 1 tablet (100 mg total) by mouth daily, Disp:  "30 tablet, Rfl: 11    topiramate (Topamax) 200 MG tablet, Take 1 tablet (200 mg total) by mouth 2 (two) times a day, Disp: 180 tablet, Rfl: 3    Vimpat 200 MG tablet, TAKE TWO TABLETS (400mg) BY MOUTH TWICE DAILY, Disp: 120 tablet, Rfl: 5    acetaminophen (TYLENOL) 325 mg tablet, Take 3 tablets (975 mg total) by mouth every 8 (eight) hours as needed for mild pain (Patient not taking: Reported on 3/6/2024), Disp: , Rfl:     Allergies    Allergies   Allergen Reactions    Amoxicillin Hives       Physical Exam    Vitals:  Blood pressure 136/78, pulse 68, temperature 97.8 °F (36.6 °C), temperature source Temporal, height 5' 7\" (1.702 m), weight (!) 159 kg (350 lb), SpO2 99%.,Body mass index is 54.82 kg/m².    Physical Exam  Neurologic Exam  Awake and alert  Grossly 5/5 throughout  Left subclavicular incision healing well with minor residual scabbing  "

## 2024-03-09 DIAGNOSIS — G40.919 INTRACTABLE EPILEPSY WITHOUT STATUS EPILEPTICUS, UNSPECIFIED EPILEPSY TYPE (HCC): ICD-10-CM

## 2024-03-11 RX ORDER — OXCARBAZEPINE 600 MG/1
TABLET, FILM COATED ORAL
Qty: 315 TABLET | Refills: 3 | Status: SHIPPED | OUTPATIENT
Start: 2024-03-11

## 2024-04-09 DIAGNOSIS — G40.919 INTRACTABLE EPILEPSY WITHOUT STATUS EPILEPTICUS, UNSPECIFIED EPILEPSY TYPE (HCC): ICD-10-CM

## 2024-04-09 RX ORDER — LEVETIRACETAM 500 MG/1
TABLET ORAL
Qty: 360 TABLET | Refills: 3 | Status: SHIPPED | OUTPATIENT
Start: 2024-04-09

## 2024-04-17 ENCOUNTER — TELEPHONE (OUTPATIENT)
Dept: NEUROLOGY | Facility: CLINIC | Age: 34
End: 2024-04-17

## 2024-04-17 DIAGNOSIS — G40.919 INTRACTABLE EPILEPSY WITHOUT STATUS EPILEPTICUS, UNSPECIFIED EPILEPSY TYPE (HCC): ICD-10-CM

## 2024-04-17 NOTE — TELEPHONE ENCOUNTER
4/15/24, 11:34    Hi, this is Liz Escalona. I'm calling for Hamzah Escalona. He's a patient of Dr Issa. His birthday is 1990. And his insurance company [employer] has denied his vimpat. Um, so I need him to prescribe a different medication. He normally takes 400 milligrams twice a day. Um it's a tier D or whatever they say. So if you give me a call back, I would appreciate it. Okay, thank you.    Chart reviewed  PA  24  See enc 23    Urgent PA renewed on CMM. Key: [id number]    Awaiting determination    Will call pt's mom after 0800

## 2024-04-17 NOTE — TELEPHONE ENCOUNTER
Per [facility]-  Approvedtoday  CaseId:[id number];Status:Approved;Review Type:Prior Auth;Coverage Start Date:03/18/2024;Coverage End Date:04/17/2025;    Called [facility] pharmacy and left a message on their answering machine letting them know of the approval and to call pt when the med is ready for . Cb if any questions.    Called [phone], spoke to Liz and made aware of all of the above. She verbalized understanding. She will f/u w/ the pharmacy.

## 2024-04-30 ENCOUNTER — TELEPHONE (OUTPATIENT)
Dept: NEUROLOGY | Facility: CLINIC | Age: 34
End: 2024-04-30

## 2024-05-01 ENCOUNTER — OFFICE VISIT (OUTPATIENT)
Dept: NEUROLOGY | Facility: CLINIC | Age: 34
End: 2024-05-01
Payer: MEDICARE

## 2024-05-01 VITALS
WEIGHT: 315 LBS | DIASTOLIC BLOOD PRESSURE: 84 MMHG | HEIGHT: 67 IN | BODY MASS INDEX: 49.44 KG/M2 | OXYGEN SATURATION: 97 % | SYSTOLIC BLOOD PRESSURE: 122 MMHG | HEART RATE: 80 BPM

## 2024-05-01 DIAGNOSIS — G40.919 INTRACTABLE EPILEPSY WITHOUT STATUS EPILEPTICUS, UNSPECIFIED EPILEPSY TYPE (HCC): ICD-10-CM

## 2024-05-01 DIAGNOSIS — Z96.89 S/P PLACEMENT OF VNS (VAGUS NERVE STIMULATION) DEVICE: Primary | ICD-10-CM

## 2024-05-01 PROCEDURE — 95970 ALYS NPGT W/O PRGRMG: CPT | Performed by: PSYCHIATRY & NEUROLOGY

## 2024-05-01 PROCEDURE — 99214 OFFICE O/P EST MOD 30 MIN: CPT | Performed by: PSYCHIATRY & NEUROLOGY

## 2024-05-01 RX ORDER — SERTRALINE HYDROCHLORIDE 100 MG/1
100 TABLET, FILM COATED ORAL DAILY
Qty: 30 TABLET | Refills: 11 | Status: SHIPPED | OUTPATIENT
Start: 2024-05-01

## 2024-05-01 RX ORDER — LACOSAMIDE 200 MG/1
TABLET, FILM COATED ORAL
Qty: 120 TABLET | Refills: 5 | Status: SHIPPED | OUTPATIENT
Start: 2024-05-01

## 2024-05-01 NOTE — PROGRESS NOTES
Patient ID: Hamzah Escalona is a 33 y.o. male with autism and epilepsy, s/p VNS implantation, who is returning to Neurology office for follow up of his seizures.       Assessment/Plan:    Intractable epilepsy without status epilepticus (HCC)  Overall, he has been seizure free and doing well since his last appointment with getting his VNS replaced and increasing his Topiramate up to his current dose. He is not having any side effects or other problems at this point, so I would recommend he continue his treatment plan unchanged for now.     -- he will continue Topiramate, Levetiracetam, Vimpat (brand necessary) and Oxcarbazepine unchanged. If he would have additional seizures, his dose of Topiramate could be increased.     -- he had blood work recently which was stable with Na of 131.     S/P placement of VNS (vagus nerve stimulation) device  I interrogated and ran a diagnostic on his VNS generator today. It is functioning well and he had been tolerating this well also. We discussed the possibility of changing from rapid cycling to using the autostimulation feature. This can yield similar benefits for seizures, but would not be expected to wear down the battery as fast. His mother preferred to keep things unchanged for now since he has been doing well.         He will Return in about 6 months (around 11/1/2024).      Subjective:    HPI  Current seizure medications:  1. Levetiracetam 1000 mg twice a day  2. Vimpat (Brand necessary) 400 mg twice a day  3. Topiramate 150 mg twice a day   4. Oxcarbazepine 900 mg in the morning and 1200 mg at night  Other medications as per Epic.    VNS Settings (interrogated today. Kalie . SN [id number]. Date implanted 2/20/2024)  Output current 2.25 mA   Signal frequency 20 Hz   Pulse width 250 ?sec   On time 14 sec   Off time 0.3 min       Mag current 2.5 mA   Mag pulse width 250 ?sec   Mag on time 60 sec     Battery: %     Since his last visit, he has continued to do well and has  "not had any additional seizures. He has been tolerating his medications well without side effects.     He had his VNS generator replaced in Feb. The procedure went well and he has been doing well with the new generator.     Labs: 2/13/2024: CMP: Na 131, ALT: 72, otherwise unremarkable, CBC: normal    Prior Seizure Medications: Carbamazepine (ineffective)    His history was also obtained from his mother, who was present at today's visit.        Objective:    Blood pressure 122/84, pulse 80, height 5' 7\" (1.702 m), weight (!) 162 kg (356 lb 3.2 oz), SpO2 97%.    Physical Exam    Neurological Exam      ROS:    Review of Systems   Constitutional:  Negative for appetite change, fatigue and fever.   HENT: Negative.  Negative for hearing loss, tinnitus, trouble swallowing and voice change.    Eyes: Negative.  Negative for photophobia, pain and visual disturbance.   Respiratory: Negative.  Negative for shortness of breath.    Cardiovascular: Negative.  Negative for palpitations.   Gastrointestinal: Negative.  Negative for nausea and vomiting.   Endocrine: Negative.  Negative for cold intolerance.   Genitourinary: Negative.  Negative for dysuria, frequency and urgency.   Musculoskeletal:  Negative for back pain, gait problem, myalgias, neck pain and neck stiffness.   Skin: Negative.  Negative for rash.   Allergic/Immunologic: Negative.    Neurological: Negative.  Negative for dizziness, tremors, seizures, syncope, facial asymmetry, speech difficulty, weakness, light-headedness, numbness and headaches.   Hematological: Negative.  Does not bruise/bleed easily.   Psychiatric/Behavioral: Negative.  Negative for confusion, hallucinations and sleep disturbance.        I personally reviewed the ROS that was entered by the medical assistant    Voice recognition software was used in the generation of this note. There may be unintentional errors including grammatical errors, spelling errors, or pronoun errors.   "

## 2024-05-01 NOTE — ASSESSMENT & PLAN NOTE
Overall, he has been seizure free and doing well since his last appointment with getting his VNS replaced and increasing his Topiramate up to his current dose. He is not having any side effects or other problems at this point, so I would recommend he continue his treatment plan unchanged for now.     -- he will continue Topiramate, Levetiracetam, Vimpat (brand necessary) and Oxcarbazepine unchanged. If he would have additional seizures, his dose of Topiramate could be increased.     -- he had blood work recently which was stable with Na of 131.

## 2024-05-01 NOTE — ASSESSMENT & PLAN NOTE
I interrogated and ran a diagnostic on his VNS generator today. It is functioning well and he had been tolerating this well also. We discussed the possibility of changing from rapid cycling to using the autostimulation feature. This can yield similar benefits for seizures, but would not be expected to wear down the battery as fast. His mother preferred to keep things unchanged for now since he has been doing well.

## 2024-07-11 ENCOUNTER — TELEPHONE (OUTPATIENT)
Age: 34
End: 2024-07-11

## 2024-07-11 NOTE — TELEPHONE ENCOUNTER
All catheters were removed.  Pt mother is on consent.  Pt mother called to schedule a 6 mth f/u with DR. Pollard.   I explain DR. Pollard schedule is not out os of now to give us a call back next month as his LOV was in may 2024.     Thank you

## 2024-08-08 ENCOUNTER — TELEPHONE (OUTPATIENT)
Age: 34
End: 2024-08-08

## 2024-08-08 DIAGNOSIS — G40.919 INTRACTABLE EPILEPSY WITHOUT STATUS EPILEPTICUS, UNSPECIFIED EPILEPSY TYPE (HCC): Primary | ICD-10-CM

## 2024-08-08 RX ORDER — LACOSAMIDE 200 MG/1
400 TABLET ORAL EVERY 12 HOURS SCHEDULED
Qty: 120 TABLET | Refills: 0 | Status: SHIPPED | OUTPATIENT
Start: 2024-08-08

## 2024-08-08 NOTE — TELEPHONE ENCOUNTER
Last visit Dr. Beasley, 5/1    Pharmacist at [facility] pharmacy called to request temporary prescription for generic vimpat as brand has been on back order and cannot guarantee they can fill for patient prior to them going on vacation .    I called patient's mother to discuss; she Is in agreement with temporary 30 day supply for generic prescription as they are leaving for vacation in 2 days and will  need medication as brand may still not be available.  She is receptive to generic and said she cannot recall any side effects from generic vs brand.  She said she would rather have generic than no medication at all.    Please send script for 30 day supply generic vimpat if in agreement, thank you.

## 2024-08-19 ENCOUNTER — TELEPHONE (OUTPATIENT)
Dept: NEUROLOGY | Facility: CLINIC | Age: 34
End: 2024-08-19

## 2024-08-21 NOTE — TELEPHONE ENCOUNTER
Request is asking for generic substitution. Patient is only able to take brand.     Patient has a brand PA on file that is valid through 4/17/2025.    Nothing further needed.

## 2024-09-05 DIAGNOSIS — G40.919 INTRACTABLE EPILEPSY WITHOUT STATUS EPILEPTICUS, UNSPECIFIED EPILEPSY TYPE (HCC): ICD-10-CM

## 2024-09-05 RX ORDER — SERTRALINE HYDROCHLORIDE 100 MG/1
100 TABLET, FILM COATED ORAL DAILY
Qty: 90 TABLET | Refills: 0 | Status: SHIPPED | OUTPATIENT
Start: 2024-09-05

## 2024-09-05 NOTE — TELEPHONE ENCOUNTER
Patient needs an appointment. Please contact the patient to schedule an appointment. Courtesy refill provided.      Patient will be due for an appt 11/2024

## 2024-09-24 DIAGNOSIS — G40.919 INTRACTABLE EPILEPSY WITHOUT STATUS EPILEPTICUS, UNSPECIFIED EPILEPSY TYPE (HCC): ICD-10-CM

## 2024-09-24 RX ORDER — DIAZEPAM 10 MG/100UL
SPRAY NASAL
Qty: 4 EACH | Refills: 0 | OUTPATIENT
Start: 2024-09-24

## 2024-09-24 RX ORDER — DIAZEPAM 10 MG/100UL
SPRAY NASAL
Qty: 4 EACH | Refills: 0 | Status: SHIPPED | OUTPATIENT
Start: 2024-09-24

## 2024-09-24 RX ORDER — DIAZEPAM 10 MG/100UL
SPRAY NASAL
Qty: 4 EACH | Refills: 0 | Status: SHIPPED | OUTPATIENT
Start: 2024-09-24 | End: 2024-09-24 | Stop reason: SDUPTHER

## 2024-09-24 NOTE — TELEPHONE ENCOUNTER
[facility] pharmacy called in for the script on this. It was sent to a different pharmacy originally.    Reason for call:   [x] Refill   [] Prior Auth  [] Other:     Office:   [] PCP/Provider -   [x] Specialty/Provider - [facility] / Sonali Pollard MD     Medication: Valtoco 20 MG Dose 10 MG/0.1ML LQPK     Dose/Frequency: GIVE ONE 10 MG SPRAY DEVICE IN EACH NOSTRIL FOR TOTAL DOSE OF 20 MG AS NEEDED FOR SEIZURES LASTING LONGER THAN 5 MIN     Quantity: 4 each     Pharmacy: [id number] - [place] - [address]     Does the patient have enough for 3 days?   [] Yes   [x] No - Send as HP to POD

## 2024-09-26 NOTE — TELEPHONE ENCOUNTER
09/26/24    Miss. Daniels From [facility] called office today requesting a PRIOR AUTHORIZATION for the following Medication:  diazePAM, 20 MG Dose, (Valtoco 20 MG Dose) 2 x 10 MG/0.1ML LQPK     I also Provided our General Fax Number [phone] Per Miss. Daniels Request.      Any questions, please contact [facility] at [phone], Fax Number: [phone], Or Contact Patient.  Thank You.

## 2024-09-27 ENCOUNTER — TELEPHONE (OUTPATIENT)
Dept: NEUROLOGY | Facility: CLINIC | Age: 34
End: 2024-09-27

## 2024-09-27 DIAGNOSIS — G40.919 INTRACTABLE EPILEPSY WITHOUT STATUS EPILEPTICUS, UNSPECIFIED EPILEPSY TYPE (HCC): ICD-10-CM

## 2024-09-30 NOTE — TELEPHONE ENCOUNTER
Prior Auth: Valtoco    KEY: [id number]    [facility]_[employer]_Medicare_2017 updated the outcome for this PA: Favorable     Start Date:08/31/2024;Coverage End Date:09/30/2025

## 2024-10-01 NOTE — TELEPHONE ENCOUNTER
Phone call to patient's mother Liz.  Advised patient's Prior Auth for Valtoco 20 mg has been approved.  -------------------------------------------------------------------------------    Liz requested refills for Vimpat 200 mg. Will require a prior auth. Denial letter scanned into media 4-15-24.     9-9-24 Rx request for generic scanned into media. Patient REQUIRES BRAND ONLY.

## 2024-10-02 RX ORDER — LACOSAMIDE 200 MG/1
TABLET, FILM COATED ORAL
Qty: 120 TABLET | Refills: 5 | Status: SHIPPED | OUTPATIENT
Start: 2024-10-02

## 2024-10-02 NOTE — TELEPHONE ENCOUNTER
Brand Vimpat covered until 4/17/25. Called [facility] in [place] as there appears to be 1 refill left. Spoke with pharm tech, who confirmed that there is one refill available, however we can send a script with refills now and they will just put it on hold.    Please sign off if agreeable. Thank you.

## 2024-10-11 DIAGNOSIS — G40.919 INTRACTABLE EPILEPSY WITHOUT STATUS EPILEPTICUS, UNSPECIFIED EPILEPSY TYPE (HCC): ICD-10-CM

## 2024-10-11 RX ORDER — TOPIRAMATE 200 MG/1
TABLET, FILM COATED ORAL
Qty: 180 TABLET | Refills: 3 | Status: SHIPPED | OUTPATIENT
Start: 2024-10-11

## 2024-10-14 ENCOUNTER — TELEPHONE (OUTPATIENT)
Dept: NEUROLOGY | Facility: CLINIC | Age: 34
End: 2024-10-14

## 2024-10-15 NOTE — TELEPHONE ENCOUNTER
Pt gets Vimpat filled at [facility] and has an approval on file that is good until 4/17/25. Please see dispense record below:    -   Dispenses     Dispensed Days Supply Quantity Provider Pharmacy   VIMPAT 200MG TABLETS 10/02/2024 30 120 each Morris Beasley MD [facility]...

## 2024-12-02 DIAGNOSIS — G40.919 INTRACTABLE EPILEPSY WITHOUT STATUS EPILEPTICUS, UNSPECIFIED EPILEPSY TYPE (HCC): ICD-10-CM

## 2024-12-03 ENCOUNTER — TELEPHONE (OUTPATIENT)
Dept: NEUROLOGY | Facility: CLINIC | Age: 34
End: 2024-12-03

## 2024-12-03 RX ORDER — SERTRALINE HYDROCHLORIDE 100 MG/1
100 TABLET, FILM COATED ORAL DAILY
Qty: 90 TABLET | Refills: 0 | Status: SHIPPED | OUTPATIENT
Start: 2024-12-03

## 2024-12-03 NOTE — TELEPHONE ENCOUNTER
Spoke to Pts mother about coming in 30 minutes earlier to appt. Pts Mother declined. Pt will keep original appt time

## 2024-12-18 ENCOUNTER — OFFICE VISIT (OUTPATIENT)
Dept: NEUROLOGY | Facility: CLINIC | Age: 34
End: 2024-12-18
Payer: MEDICARE

## 2024-12-18 VITALS
OXYGEN SATURATION: 99 % | DIASTOLIC BLOOD PRESSURE: 90 MMHG | HEIGHT: 67 IN | HEART RATE: 69 BPM | SYSTOLIC BLOOD PRESSURE: 138 MMHG | WEIGHT: 315 LBS | BODY MASS INDEX: 49.44 KG/M2

## 2024-12-18 DIAGNOSIS — G40.919 INTRACTABLE EPILEPSY WITHOUT STATUS EPILEPTICUS, UNSPECIFIED EPILEPSY TYPE (HCC): Primary | ICD-10-CM

## 2024-12-18 PROCEDURE — 95970 ALYS NPGT W/O PRGRMG: CPT | Performed by: STUDENT IN AN ORGANIZED HEALTH CARE EDUCATION/TRAINING PROGRAM

## 2024-12-18 PROCEDURE — 99215 OFFICE O/P EST HI 40 MIN: CPT | Performed by: STUDENT IN AN ORGANIZED HEALTH CARE EDUCATION/TRAINING PROGRAM

## 2024-12-18 RX ORDER — LACOSAMIDE 200 MG/1
400 TABLET ORAL EVERY 12 HOURS SCHEDULED
Qty: 360 TABLET | Refills: 1 | Status: SHIPPED | OUTPATIENT
Start: 2024-12-18 | End: 2025-06-16

## 2024-12-18 RX ORDER — OXCARBAZEPINE 600 MG/1
TABLET, FILM COATED ORAL
Qty: 315 TABLET | Refills: 3 | Status: SHIPPED | OUTPATIENT
Start: 2024-12-18

## 2024-12-18 RX ORDER — TOPIRAMATE 200 MG/1
200 TABLET, FILM COATED ORAL 2 TIMES DAILY
Qty: 180 TABLET | Refills: 1 | Status: SHIPPED | OUTPATIENT
Start: 2024-12-18 | End: 2025-06-16

## 2024-12-18 RX ORDER — LEVETIRACETAM 500 MG/1
1000 TABLET ORAL EVERY 12 HOURS SCHEDULED
Qty: 360 TABLET | Refills: 1 | Status: SHIPPED | OUTPATIENT
Start: 2024-12-18 | End: 2025-06-16

## 2024-12-18 NOTE — ASSESSMENT & PLAN NOTE
- continue current AEDs: levetiracetam 1366-6735, Vimpat 400-400, topiramate 200-200, oxcarb 900-1200  - baseline AED levels  - CBC, CMP  Orders:    Comprehensive metabolic panel; Future    CBC and differential; Future    Oxcarbazepine level; Future    Levetiracetam level; Future    Lacosamide; Future    Topiramate level; Future    lacosamide (VIMPAT) 200 mg tablet; Take 2 tablets (400 mg total) by mouth every 12 (twelve) hours    levETIRAcetam (KEPPRA) 500 mg tablet; Take 2 tablets (1,000 mg total) by mouth every 12 (twelve) hours    OXcarbazepine (TRILEPTAL) 600 mg tablet; TAKE 1 AND 1/2 TABLETS EVERY MORNING AND TAKE 2 TABLETS EVERY EVENING    topiramate (TOPAMAX) 200 MG tablet; Take 1 tablet (200 mg total) by mouth 2 (two) times a day

## 2024-12-18 NOTE — PROGRESS NOTES
Epilepsy Ambulatory Visit  Name: Hamzah Escalona       : 1990       MRN: [id number]   Encounter Provider: Sonali Pollard MD   Encounter Date: 2024  Encounter department: [facility]    Hamzah Escalona is a  34 y.o. male with autism and epilepsy s/p VNS implanatation who presents as MARTINA from Dr. Beasley. He is currently doing very well on his current regimen with the last seizure over a year ago.     4-Dimensional Classification  Epileptic Paroxysmal Episode   Semiology: Dialeptic -> GTC  Epileptogenic Zone: Unknown  Etiology: Likely genetic  Comorbidities: Autism  Current Epilepsy Medications: Levetiracetam 1720-0019, Vimpat (Brand necessary) 400-400, Topiramate 200-200, Oxcarbazepine 900-1200  Prior Epilepsy Medications: Carbamazepine (ineffective)    Assessment & Plan  Intractable epilepsy without status epilepticus, unspecified epilepsy type (HCC)  - continue current AEDs: levetiracetam 6550-1238, Vimpat 400-400, topiramate 200-200, oxcarb 900-1200  - baseline AED levels  - CBC, CMP  Orders:    Comprehensive metabolic panel; Future    CBC and differential; Future    Oxcarbazepine level; Future    Levetiracetam level; Future    Lacosamide; Future    Topiramate level; Future    lacosamide (VIMPAT) 200 mg tablet; Take 2 tablets (400 mg total) by mouth every 12 (twelve) hours    levETIRAcetam (KEPPRA) 500 mg tablet; Take 2 tablets (1,000 mg total) by mouth every 12 (twelve) hours    OXcarbazepine (TRILEPTAL) 600 mg tablet; TAKE 1 AND 1/2 TABLETS EVERY MORNING AND TAKE 2 TABLETS EVERY EVENING    topiramate (TOPAMAX) 200 MG tablet; Take 1 tablet (200 mg total) by mouth 2 (two) times a day       RTC 6 mo       HISTORY OF PRESENT ILLNESS    Per patient:  His last seizure was a year and a half ago. His seizures more recently, have been where he is staring and looks glazed over. This lasts for about a minute. He falls with these episodes. She feels like there is some shaking of  his arms associated with this.     He is the best he has ever been right now in terms of epilepsy. They do not want to change anything. In the past he was having seizures every other day. This was around 20 years ago.     He had a VNS placed around .    During the day, he attends a day program.     Per chart review:  He was last seen in May of 2024. He had his VNS generator replaced in Feb. It was discussed at last visit possibility of changing from rapid cycling to autostimulation feature.     Event/Seizure semiology:  1.  Likely focal unaware seizures: he will start with grunting noises, followed by drooling and loss of consciousness. He will often fall and on occasion may have some shaking. These typically last up to a maximum of about 2.5 minutes. These occur about once every 2 months on average.   2. Generalized tonic clonic seizure. He will lose consciousness, fall the ground, become stiff all over and shake. Has not occurred in many years.     Special Features  Status epilepticus: no  Self Injury Seizures: no  Precipitating Factors: none      VNS Interrogation  Output current 2.25 mA   Signal frequency 20 Hz   Pulse width 250 ?sec   On time 14 sec   Off time 0.3 min         Mag current 2.5 mA   Mag pulse width 250 ?sec   Mag on time 60 sec    Battery: %         Epilepsy Risk Factors:  Born at term via uncomplicated pregnancy, but did have developmental delay and was diagnosed with autism at 4 yo. No h/o febrile seizures, CNS infections, strokes, or CNS neoplasms.  There is no family history of seizures or epilepsy.    Prior Work-up:   MRI Brain: none  - Routine EE2006: normal  - Routine EE2016: normal  - Video EEG: yes, in [place] in mid-. No events were captured    Past Medical History:    Past Medical History:   Diagnosis Date    Autism     nonverbal    Seizures (HCC)      Past Surgical History:   Procedure Laterality Date    EXTERNAL EAR SURGERY      x2 for boxer's ear     HYDROCELE EXCISION / REPAIR      VA INSJ/RPLCMT CRANIAL NEUROSTIM PULSE GENERATOR Left 7/26/2021    Procedure: REOPENING OF LEFT CHEST INCISION FOR REPLACEMENT OF IMPLANTABLE PULSE GENERATOR VAGUS NERVE STIMULATOR;  Surgeon: Nikko Petty MD;  [facility];  Service: Neurosurgery    VA INSJ/RPLCMT CRANIAL NEUROSTIM PULSE GENERATOR Left 2/20/2024    Procedure: Replacement of left sided chest VNS battery;  Surgeon: Maxim Orantes MD;  [facility];  Service: Neurosurgery    VAGAL NERVE STIMULATOR (VNS) PLACEMENT         Family History:  Family History   Problem Relation Age of Onset    No Known Problems Mother     Arthritis Father     COPD Maternal Grandmother     Heart disease Maternal Grandfather     Arthritis Paternal Grandmother     Heart disease Paternal Grandfather      There is no family history of epilepsy.     Social History:  Social History     Tobacco Use    Smoking status: Never    Smokeless tobacco: Never   Vaping Use    Vaping status: Never Used   Substance Use Topics    Alcohol use: Never    Drug use: Never        Allergies:  Allergies   Allergen Reactions    Amoxicillin Hives       Medications:    Current Outpatient Medications:     diazePAM, 20 MG Dose, (Valtoco 20 MG Dose) 2 x 10 MG/0.1ML LQPK, Give one 10 mg spray device in each nostril for total dose of 20 mg as needed for seizures lasting longer than 5 min. (Patient taking differently: if needed Give one 10 mg spray device in each nostril for total dose of 20 mg as needed for seizures lasting longer than 5 min.), Disp: 4 each, Rfl: 0    lacosamide (VIMPAT) 200 mg tablet, Take 2 tablets (400 mg total) by mouth every 12 (twelve) hours, Disp: 360 tablet, Rfl: 1    levETIRAcetam (KEPPRA) 500 mg tablet, Take 2 tablets (1,000 mg total) by mouth every 12 (twelve) hours, Disp: 360 tablet, Rfl: 1    OXcarbazepine (TRILEPTAL) 600 mg tablet, TAKE 1 AND 1/2 TABLETS EVERY MORNING AND TAKE 2 TABLETS EVERY EVENING, Disp: 315 tablet, Rfl: 3     "sertraline (ZOLOFT) 100 mg tablet, TAKE 1 TABLET(100 MG) BY MOUTH DAILY, Disp: 90 tablet, Rfl: 0    topiramate (TOPAMAX) 200 MG tablet, Take 1 tablet (200 mg total) by mouth 2 (two) times a day, Disp: 180 tablet, Rfl: 1      OBJECTIVE  /90 (BP Location: Left arm, Patient Position: Sitting, Cuff Size: Large)   Pulse 69   Ht 5' 7\" (1.702 m)   Wt (!) 157 kg (345 lb 12.8 oz)   SpO2 99%   BMI 54.16 kg/m²      VNS Interrogation  Model: SeTiva   S/N: [id number]  Date of implant: 2/20/24  Output current 2.25 mA   Signal frequency 20 Hz   Pulse width 250 ?sec   On time 14 sec   Off time 0.3 min         Mag current 2.5 mA   Mag pulse width 250 ?sec   Mag on time 60 sec    Battery: %     Labs  (Scanned) 2/13/24  CMP: Cr 0.91, AST 36, ALT 72, Alk phos 108, Sodium 131, K 3.5  CBC: WBC 4.7, Hgb 15.8, Hct 44.3, Plt 260    General Exam  General: Well-appearing, in no distress.   Cardiac: Warm and well-perfused  Pulmonary: Easy work of breathing    Neurologic Exam  Mental Status: Oriented to person and follows simple commands.   Language: Echolalia, perseverative of \"beast\" as he will be watching beauty and the beast this morning.   Cranial Nerves: PERRL. EOMI with no nystagmus. Face is symmetric. Hearing is intact to conversation.    Motor:  5/5 strength in all extremities.   Coordination: No dysmetria on finger to nose.   Gait: Wide based steady gait.     Administrative Statements   The total amount of time spent with the patient and on chart review and documentation was 40 minutes. Issues addressed during this clinic visit included overall management and counseling on diagnosis and treatment.   "

## 2025-02-25 ENCOUNTER — TELEPHONE (OUTPATIENT)
Age: 35
End: 2025-02-25

## 2025-02-25 DIAGNOSIS — G40.919 INTRACTABLE EPILEPSY WITHOUT STATUS EPILEPTICUS, UNSPECIFIED EPILEPSY TYPE (HCC): ICD-10-CM

## 2025-02-25 RX ORDER — SERTRALINE HYDROCHLORIDE 100 MG/1
100 TABLET, FILM COATED ORAL DAILY
Qty: 90 TABLET | Refills: 1 | Status: SHIPPED | OUTPATIENT
Start: 2025-02-25

## 2025-02-25 NOTE — TELEPHONE ENCOUNTER
Lacosamide Prior Authorization    Key: [id number]    Pending Determination  ----------------------------------------------------------------------  Phone call to [facility] pharmacist to get patient's insurance information:    BIN: [id number]  PCN: [id number]  [employer] [id number]  Member ID# [id number]

## 2025-02-26 NOTE — TELEPHONE ENCOUNTER
Lacosamide Prior Authorization    APPROVED on February 25 by [employer]     Effective Date: 1/1/2025  Authorization Expiration Date: 2/25/2026  -----------------------------------------------------------  Phone call to [facility] pharmacist. Advised same. Paid claim processed successfully.   -----------------------------------------------------------  Phone call to patient's Mother. Advised same.

## 2025-03-25 DIAGNOSIS — G40.919 INTRACTABLE EPILEPSY WITHOUT STATUS EPILEPTICUS, UNSPECIFIED EPILEPSY TYPE (HCC): ICD-10-CM

## 2025-03-26 RX ORDER — LEVETIRACETAM 500 MG/1
1000 TABLET ORAL EVERY 12 HOURS SCHEDULED
Qty: 360 TABLET | Refills: 1 | Status: SHIPPED | OUTPATIENT
Start: 2025-03-26 | End: 2025-09-22

## 2025-05-24 DIAGNOSIS — G40.919 INTRACTABLE EPILEPSY WITHOUT STATUS EPILEPTICUS, UNSPECIFIED EPILEPSY TYPE (HCC): ICD-10-CM

## 2025-05-27 RX ORDER — SERTRALINE HYDROCHLORIDE 100 MG/1
100 TABLET, FILM COATED ORAL DAILY
Qty: 90 TABLET | Refills: 0 | Status: SHIPPED | OUTPATIENT
Start: 2025-05-27

## 2025-06-09 ENCOUNTER — TELEPHONE (OUTPATIENT)
Dept: NEUROLOGY | Facility: CLINIC | Age: 35
End: 2025-06-09

## 2025-06-09 NOTE — TELEPHONE ENCOUNTER
Called pt's mother prateek as a reminder that there are outstanding blood work orders that are to be completed prior to the pt's appt. Prateek asked if I could email bloodwork scripts to her.     Prateek is on the medical communication consent.     Will email scripts to [email or web address]

## 2025-06-19 ENCOUNTER — OFFICE VISIT (OUTPATIENT)
Dept: NEUROLOGY | Facility: CLINIC | Age: 35
End: 2025-06-19
Payer: MEDICARE

## 2025-06-19 VITALS
HEIGHT: 67 IN | OXYGEN SATURATION: 99 % | HEART RATE: 75 BPM | SYSTOLIC BLOOD PRESSURE: 140 MMHG | BODY MASS INDEX: 49.44 KG/M2 | DIASTOLIC BLOOD PRESSURE: 80 MMHG | WEIGHT: 315 LBS

## 2025-06-19 DIAGNOSIS — Z96.89 S/P PLACEMENT OF VNS (VAGUS NERVE STIMULATION) DEVICE: ICD-10-CM

## 2025-06-19 DIAGNOSIS — G40.919 INTRACTABLE EPILEPSY WITHOUT STATUS EPILEPTICUS, UNSPECIFIED EPILEPSY TYPE (HCC): Primary | ICD-10-CM

## 2025-06-19 PROCEDURE — G2211 COMPLEX E/M VISIT ADD ON: HCPCS | Performed by: STUDENT IN AN ORGANIZED HEALTH CARE EDUCATION/TRAINING PROGRAM

## 2025-06-19 PROCEDURE — 95970 ALYS NPGT W/O PRGRMG: CPT | Performed by: STUDENT IN AN ORGANIZED HEALTH CARE EDUCATION/TRAINING PROGRAM

## 2025-06-19 PROCEDURE — 99214 OFFICE O/P EST MOD 30 MIN: CPT | Performed by: STUDENT IN AN ORGANIZED HEALTH CARE EDUCATION/TRAINING PROGRAM

## 2025-06-19 RX ORDER — OXCARBAZEPINE 600 MG/1
TABLET, FILM COATED ORAL
Qty: 315 TABLET | Refills: 3 | Status: SHIPPED | OUTPATIENT
Start: 2025-06-19

## 2025-06-19 RX ORDER — TOPIRAMATE 200 MG/1
200 TABLET, FILM COATED ORAL 2 TIMES DAILY
Qty: 180 TABLET | Refills: 1 | Status: SHIPPED | OUTPATIENT
Start: 2025-06-19 | End: 2025-12-16

## 2025-06-19 RX ORDER — LEVETIRACETAM 500 MG/1
1000 TABLET ORAL EVERY 12 HOURS SCHEDULED
Qty: 360 TABLET | Refills: 1 | Status: SHIPPED | OUTPATIENT
Start: 2025-06-19 | End: 2025-12-16

## 2025-06-19 RX ORDER — LACOSAMIDE 200 MG/1
400 TABLET ORAL EVERY 12 HOURS SCHEDULED
Qty: 360 TABLET | Refills: 1 | Status: SHIPPED | OUTPATIENT
Start: 2025-06-19 | End: 2025-12-16

## 2025-06-19 NOTE — PROGRESS NOTES
Epilepsy Ambulatory Visit  Name: Hamzah Escalona       : 1990       MRN: [id number]   Encounter Provider: Sonali Pollard MD   Encounter Date: 2025  Encounter department: [facility]    Hamzah Escalona is a 34 y.o. male with autism and epilepsy s/p VNS implantation (, replaced 2024) who presents for follow-up of epilepsy. He is doing well, he had one brief focal impaired seizure in December.     4-Dimensional Classification  Epileptic Paroxysmal Episode   Semiology: Dialeptic* -> GTC  Epileptogenic Zone: Unknown  Etiology: Likely genetic  Comorbidities: Autism  Current Epilepsy Medications: Levetiracetam 2269-1694, Vimpat (Brand necessary) 400-400, Topiramate 200-200, Oxcarbazepine 900-1200  Prior Epilepsy Medications: Carbamazepine (ineffective)    *Dialeptic: Episode of unresponsiveness or decreased responsiveness, associated with amnesia for the episode.     Assessment & Plan  Intractable epilepsy without status epilepticus, unspecified epilepsy type (HCC)  - continue levetiracetam 2440-5925, Vimpat 400-400, topiramate 200-200, oxcarbazepine 900-1200  - f/u on AED levels  Orders:    lacosamide (VIMPAT) 200 mg tablet; Take 2 tablets (400 mg total) by mouth every 12 (twelve) hours    levETIRAcetam (KEPPRA) 500 mg tablet; Take 2 tablets (1,000 mg total) by mouth every 12 (twelve) hours    OXcarbazepine (TRILEPTAL) 600 mg tablet; TAKE 1 AND 1/2 TABLETS EVERY MORNING AND TAKE 2 TABLETS EVERY EVENING    topiramate (TOPAMAX) 200 MG tablet; Take 1 tablet (200 mg total) by mouth 2 (two) times a day    diazePAM, 20 MG Dose, (Valtoco 20 MG Dose) 2 x 10 MG/0.1ML LQPK; Give one 10 mg spray device in each nostril for total dose of 20 mg as needed for seizures lasting longer than 5 min.    S/P placement of VNS (vagus nerve stimulation) device  - he is on rapid cycling  - interrogated today with 50-75% battery remaining.         RTC 5 mo        INTERVAL HISTORY  Hamzah had one  "seizure around December. He \"phased out for a few seconds.\" He is tolerating his medication well and they are pleased with his seizure control. Sometimes he comes out of the shower and he will lose his balance.    He did have bloodwork done at [facility] but we do not have access to it yet; patient's mother called the lab on the phone and they will fax it over. Their number is [phone] (contact name Nilson).       VNS Interrogation 6/19  Model: Sentiva [id number]  Serial Number: [id number]  Date implanted: 2/20/2024  Output current 2.25 mA   Signal frequency 20 Hz   Pulse width 250 ?sec   On time 14 sec   Off time 0.3 min   Duty Cycle 56%         Mag current 2.5 mA   Mag pulse width 250 ?sec   Mag on time 60 sec   Tachycardia detection: Enabled  Autostim threshold: 80%    Battery: 50-75%      Seizure History:  His last seizure was a year and a half ago. His seizures more recently, have been where he is staring and looks glazed over. This lasts for about a minute. He falls with these episodes. She feels like there is some shaking of his arms associated with this.      He is the best he has ever been right now in terms of epilepsy. They do not want to change anything. In the past he was having seizures every other day. This was around 20 years ago. He had a VNS placed around 2005 and the battery was replaced in Feb 2024.     Recently he does have rare seizures where he is staring and glazed over. These last for about a minute. He sometimes falls with these episodes. Previously, he would have generalized tonic clonic seizures where he would fall to the ground, become stiff and shake all over. This has not occurred in years.      Epilepsy Risk Factors:  Born at term via uncomplicated pregnancy, but did have developmental delay and was diagnosed with autism at 4 yo. No h/o febrile seizures, CNS infections, strokes, or CNS neoplasms.  There is no family history of seizures or epilepsy.     Prior Work-up:   Routine EEG: " "2006: normal  Routine EE2016: normal  Video EEG: yes, in [place] in mid-. No events were captured      Past Medical History:    Past Medical History[1]  Past Surgical History[2]    Family History:  Family History[3]    Social History:  Social History[4]   He attends a day program.   He has a picture book of Beauty and the Beast, says he will be watching Liz Cardona this weekend.   Patient's birthday is [birth year].     Allergies:  Allergies[5]    Medications:  Current Medications[6]      OBJECTIVE  /80 (BP Location: Left arm, Patient Position: Sitting, Cuff Size: Large)   Pulse 75   Ht 5' 7\" (1.702 m)   Wt (!) 154 kg (340 lb)   SpO2 99%   BMI 53.25 kg/m²      Labs  I have reviewed pertinent labs:  CBC:   Lab Results   Component Value Date    WBC 4.10 (L) 2021    RBC 4.71 2021    HGB 15.1 2021    HCT 42.4 2021    MCV 90 2021     2021    MCH 32.1 2021    MCHC 35.6 2021    RDW 11.7 2021    MPV 8.5 (L) 2021    NEUTROABS 1.92 2021     CMP:   Lab Results   Component Value Date    SODIUM 131 (L) 2021    K 3.6 2021     2021    CO2 21 2021    AGAP 6 2021    BUN 10 2021    CREATININE 0.73 2021    GLUC 88 2021    GLUF 88 2021    CALCIUM 8.2 (L) 2021    AST 22 2021    ALT 54 2021    ALKPHOS 133 (H) 2021    TP 6.9 2021    ALB 3.4 (L) 2021    TBILI 0.31 2021    EGFR 125 2021       Lab Results   Component Value Date/Time    HGBA1C 4.4 2021 12:00 AM       General Exam  GENERAL APPEARANCE:  No distress, alert, interactive and cooperative.  CARDIOVASCULAR: Warm and well perfused  LUNGS: normal work of breathing on room air  EXTREMITIES: no peripheral edema     Neurologic Exam  Mental Status: Oriented to person and follows simple commands.    Language: Speaks in one or two-word sentences, perseverative on the movies he " will be watching.   Cranial Nerves: EOMI with no nystagmus. Face is symmetric. Hearing is intact to conversation.    Motor:  Antigravity in all extremities.   Gait: Wide-based steady gait.     Administrative Statements   The total amount of time spent with the patient and on chart review and documentation was 30 minutes. Issues addressed during this visit included counseling on medical management and counseling on medication side effects.          [1]   Past Medical History:  Diagnosis Date    Autism     nonverbal    Seizures (HCC)    [2]   Past Surgical History:  Procedure Laterality Date    EXTERNAL EAR SURGERY      x2 for boxer's ear    HYDROCELE EXCISION / REPAIR      AL INSJ/RPLCMT CRANIAL NEUROSTIM PULSE GENERATOR Left 7/26/2021    Procedure: REOPENING OF LEFT CHEST INCISION FOR REPLACEMENT OF IMPLANTABLE PULSE GENERATOR VAGUS NERVE STIMULATOR;  Surgeon: Nikko Petty MD;  Location: [facility];  Service: Neurosurgery    AL INSJ/RPLCMT CRANIAL NEUROSTIM PULSE GENERATOR Left 2/20/2024    Procedure: Replacement of left sided chest VNS battery;  Surgeon: Maxim Orantes MD;  Location: [facility];  Service: Neurosurgery    VAGAL NERVE STIMULATOR (VNS) PLACEMENT     [3]   Family History  Problem Relation Name Age of Onset    No Known Problems Mother      Arthritis Father      COPD Maternal Grandmother      Heart disease Maternal Grandfather      Arthritis Paternal Grandmother      Heart disease Paternal Grandfather     [4]   Social History  Tobacco Use    Smoking status: Never    Smokeless tobacco: Never   Vaping Use    Vaping status: Never Used   Substance Use Topics    Alcohol use: Never    Drug use: Never   [5]   Allergies  Allergen Reactions    Amoxicillin Hives   [6]   Current Outpatient Medications:     diazePAM, 20 MG Dose, (Valtoco 20 MG Dose) 2 x 10 MG/0.1ML LQPK, Give one 10 mg spray device in each nostril for total dose of 20 mg as needed for seizures lasting longer than 5 min. (Patient taking differently:  if needed Give one 10 mg spray device in each nostril for total dose of 20 mg as needed for seizures lasting longer than 5 min.), Disp: 4 each, Rfl: 0    lacosamide (VIMPAT) 200 mg tablet, Take 2 tablets (400 mg total) by mouth every 12 (twelve) hours, Disp: 360 tablet, Rfl: 1    levETIRAcetam (KEPPRA) 500 mg tablet, Take 2 tablets (1,000 mg total) by mouth every 12 (twelve) hours, Disp: 360 tablet, Rfl: 1    OXcarbazepine (TRILEPTAL) 600 mg tablet, TAKE 1 AND 1/2 TABLETS EVERY MORNING AND TAKE 2 TABLETS EVERY EVENING, Disp: 315 tablet, Rfl: 3    sertraline (ZOLOFT) 100 mg tablet, TAKE 1 TABLET(100 MG) BY MOUTH DAILY, Disp: 90 tablet, Rfl: 0    topiramate (TOPAMAX) 200 MG tablet, Take 1 tablet (200 mg total) by mouth 2 (two) times a day, Disp: 180 tablet, Rfl: 1

## 2025-06-19 NOTE — ASSESSMENT & PLAN NOTE
- continue levetiracetam 9516-7872, Vimpat 400-400, topiramate 200-200, oxcarbazepine 900-1200  - f/u on AED levels  Orders:    lacosamide (VIMPAT) 200 mg tablet; Take 2 tablets (400 mg total) by mouth every 12 (twelve) hours    levETIRAcetam (KEPPRA) 500 mg tablet; Take 2 tablets (1,000 mg total) by mouth every 12 (twelve) hours    OXcarbazepine (TRILEPTAL) 600 mg tablet; TAKE 1 AND 1/2 TABLETS EVERY MORNING AND TAKE 2 TABLETS EVERY EVENING    topiramate (TOPAMAX) 200 MG tablet; Take 1 tablet (200 mg total) by mouth 2 (two) times a day    diazePAM, 20 MG Dose, (Valtoco 20 MG Dose) 2 x 10 MG/0.1ML LQPK; Give one 10 mg spray device in each nostril for total dose of 20 mg as needed for seizures lasting longer than 5 min.

## 2025-06-24 ENCOUNTER — TELEPHONE (OUTPATIENT)
Age: 35
End: 2025-06-24

## 2025-06-24 NOTE — TELEPHONE ENCOUNTER
Aleshia from [facility] called to check status of [place] request for patients Valtoco FAX receipt; advised received (in media 6/23/25).     Please assist,    Thank you

## 2025-06-26 NOTE — TELEPHONE ENCOUNTER
06/26/25    Miss. Monk from [facility] called office today to check on the status of a PRIOR AUTH for VALTOCO Medication.      Please Review and Contact Miss. Monk at [phone].    Reference Number: [id number].  Thank You.

## 2025-07-03 NOTE — TELEPHONE ENCOUNTER
Valtoco PA completed on CMM  Key: [id number]  Urgent request    Received approval on CMM  Outcome  Approved today by [employer] NCPDP 2017  Your request has been approved  Effective Date: 1/1/2025  Authorization Expiration Date: 7/3/2026    Called amandeep at [phone], spoke to derrick.  Made her aware of approval. They will get this ready for pt

## 2025-07-17 DIAGNOSIS — G40.919 INTRACTABLE EPILEPSY WITHOUT STATUS EPILEPTICUS, UNSPECIFIED EPILEPSY TYPE (HCC): ICD-10-CM

## 2025-07-17 NOTE — TELEPHONE ENCOUNTER
Patients mother called the RX Refill Line. Message is being forwarded to the office.     Patients mother  talked to insurance company, patient going on vacation needs over ride to get prescription filled early. Insurance company stated DR need to call pharmacy to approve over ride  Medication is Lacosamide 200 mg     [id number] - [place] - [address] [phone]     Any questions Please contact patient at [phone]

## 2025-07-23 RX ORDER — LACOSAMIDE 200 MG/1
400 TABLET ORAL EVERY 12 HOURS SCHEDULED
Qty: 120 TABLET | Refills: 0 | Status: SHIPPED | OUTPATIENT
Start: 2025-07-23 | End: 2026-01-19

## 2025-07-23 NOTE — TELEPHONE ENCOUNTER
Called [facility] in [place], [place], to see if they received script and that everything is in order and spoke with the pharmacist Cee, who stated that the claim already went through, which is odd because insurance said that the pt could not get a early refill, yet the claim went through for today when [facility] in [place], [place], ran it. It should have come up as too early to dispense as it did here in [place].    Called to make aware and spoke with pt's mother, who verbalized an understanding. Nothing further needed from our office.

## 2025-07-23 NOTE — TELEPHONE ENCOUNTER
"Called [facility] to get approval for early fill for vacation and spoke with Shea, who transferred this writer to Michelle.    Per Michelle, she was not able to get an override approved after speaking directly with [facility] pharmacy, so she put this writer on hold to be transferred this to the \"dedicated team\" for further assistance.    Michelle got back on the line and stated that she spoke to their dedicated team and according to their supervisor, they cannot approve an override. Pt needs to request for an override. Advised Michelle that we do this all the time. We are the prescribing physician that is why we are requesting for an override. Michelle then disconnected the call.     Time of call thus far: 40 minutes.    Called back and spoke with Nehemias, who attempted an override, however had to transfer this writer to the \"dedicated team\" again. During the transfer, this writer was disconnected, again.    Time of call thus far: 75 minutes.    Called back and spoke with Kevin, who stated that she could not locate the pt despite providing 6 pt identifiers.    Called back to speak to a manager, and spoke with representative Neva, who stated that I needed to provide pt information first. Provided requested information and Neva still refused to transfer this writer to a manager. She informed that she saw representative Nehemias's note's re: a vacation override, but nothing else. Then she stated that this type of override usually requires the pharmacy to reach out to the plan and I informed her that representative Michelle already spoke directly with the pharmacy and somehow it was still denied. Neva stated that this is how it must be done and provided the # to give the pharmacy to call and request the vacation override directly, which is # [phone].     Time of call thus far 110 minutes.    Called [facility] to provide # for them to call to get override approved and spoke with the pharmacist, who stated that she will call the # " when she gets a chance.    Will call pharmacy back later to see if override was approved.

## 2025-07-23 NOTE — TELEPHONE ENCOUNTER
Called pt's mother to inform of below and she verbalized an understanding. She was unsure if this would work so this writer stated that I would call ahead to [facility] in [place], [place], where the family would be vacationing and spoke with the pharmacist, Cee. Per Cee, this will be fine as pt does not have [place] medicaid as primary, but rather Med part D with [employer].    Called back to inform her of this, and spoke with pt's mother, who is in agreement with this plan.    Dr. Pollard - please sign off on pended script below and once it is sent, this writer will call [facility] to follow up and ensure pt will be able to pick it up there while on vacation. Thank you.

## 2025-07-23 NOTE — TELEPHONE ENCOUNTER
jessica, pharmacist from [facility] called regarding lacosamide 200mg.  She spoke to insurance and   tried to get an early refill, states that max allowed is 2 per day and he takes 4 a day   This will need a need [place] prior auth prior to allow [place] override.    Or she states that pt may just need to pick it up where ever he is going on vacation.

## (undated) DEVICE — NEEDLE 25G X 1 1/2

## (undated) DEVICE — GLOVE SRG BIOGEL 8

## (undated) DEVICE — MEDI-VAC YANK SUCT HNDL W/TPRD BULBOUS TIP: Brand: CARDINAL HEALTH

## (undated) DEVICE — DRESSING MEPILEX AG BORDER 4 X 4 IN

## (undated) DEVICE — SPONGE PVP SCRUB WING STERILE

## (undated) DEVICE — 3M™ IOBAN™ 2 ANTIMICROBIAL INCISE DRAPE 6640EZ: Brand: IOBAN™ 2

## (undated) DEVICE — BULB SYRINGE,IRRIGATION WITH PROTECTIVE CAP: Brand: DOVER

## (undated) DEVICE — ANTIBACTERIAL VIOLET BRAIDED (POLYGLACTIN 910), SYNTHETIC ABSORBABLE SUTURE: Brand: COATED VICRYL

## (undated) DEVICE — BETHLEHEM UNIVERSAL MINOR GEN: Brand: CARDINAL HEALTH

## (undated) DEVICE — DRAPE CAMERA/LASER

## (undated) DEVICE — GLOVE INDICATOR PI UNDERGLOVE SZ 8 BLUE

## (undated) DEVICE — PAD GROUNDING DUAL ADULT

## (undated) DEVICE — GLOVE SRG BIOGEL 7.5

## (undated) DEVICE — SUT MONOCRYL PLUS 4-0 PS-2 18 IN MCP496G

## (undated) DEVICE — PREP SURGICAL PURPREP 26ML

## (undated) DEVICE — PENCIL ELECTROSURG E-Z CLEAN -0035H

## (undated) DEVICE — SPONGE SCRUB 4 PCT CHLORHEXIDINE

## (undated) DEVICE — SUT CHROMIC 5-0 P-3 18 IN 687G

## (undated) DEVICE — LIGHT HANDLE COVER SLEEVE DISP BLUE STELLAR

## (undated) DEVICE — SUT SILK 2-0 SH 30 IN K833H

## (undated) DEVICE — ADHESIVE SKIN HIGH VISCOSITY EXOFIN 1ML

## (undated) DEVICE — ELECTRODE BLADE MOD E-Z CLEAN 2.5IN 6.4CM -0012M

## (undated) DEVICE — GLOVE INDICATOR PI UNDERGLOVE SZ 7.5 BLUE

## (undated) DEVICE — INTENDED FOR TISSUE SEPARATION, AND OTHER PROCEDURES THAT REQUIRE A SHARP SURGICAL BLADE TO PUNCTURE OR CUT.: Brand: BARD-PARKER ® CARBON RIB-BACK BLADES

## (undated) DEVICE — SKIN MARKER DUAL TIP WITH RULER CAP, FLEXIBLE RULER AND LABELS: Brand: DEVON

## (undated) DEVICE — CHLORAPREP HI-LITE 26ML ORANGE

## (undated) DEVICE — TUBING SUCTION 5MM X 12 FT

## (undated) DEVICE — PAD GROUNDING ADULT